# Patient Record
Sex: FEMALE | Race: WHITE | NOT HISPANIC OR LATINO | Employment: UNEMPLOYED | URBAN - METROPOLITAN AREA
[De-identification: names, ages, dates, MRNs, and addresses within clinical notes are randomized per-mention and may not be internally consistent; named-entity substitution may affect disease eponyms.]

---

## 2020-09-22 ENCOUNTER — TRANSCRIBE ORDERS (OUTPATIENT)
Dept: PREADMISSION TESTING | Facility: HOSPITAL | Age: 50
End: 2020-09-22

## 2020-09-22 DIAGNOSIS — Z01.818 OTHER SPECIFIED PRE-OPERATIVE EXAMINATION: Primary | ICD-10-CM

## 2020-09-24 ENCOUNTER — APPOINTMENT (OUTPATIENT)
Dept: PREADMISSION TESTING | Facility: HOSPITAL | Age: 50
End: 2020-09-24

## 2020-09-24 VITALS
SYSTOLIC BLOOD PRESSURE: 139 MMHG | WEIGHT: 214 LBS | DIASTOLIC BLOOD PRESSURE: 89 MMHG | BODY MASS INDEX: 37.92 KG/M2 | HEIGHT: 63 IN | TEMPERATURE: 98.7 F | OXYGEN SATURATION: 97 % | HEART RATE: 73 BPM | RESPIRATION RATE: 16 BRPM

## 2020-09-24 LAB
ANION GAP SERPL CALCULATED.3IONS-SCNC: 11.3 MMOL/L (ref 5–15)
BUN SERPL-MCNC: 16 MG/DL (ref 6–20)
BUN/CREAT SERPL: 18.4 (ref 7–25)
CALCIUM SPEC-SCNC: 8.9 MG/DL (ref 8.6–10.5)
CHLORIDE SERPL-SCNC: 105 MMOL/L (ref 98–107)
CO2 SERPL-SCNC: 25.7 MMOL/L (ref 22–29)
CREAT SERPL-MCNC: 0.87 MG/DL (ref 0.57–1)
DEPRECATED RDW RBC AUTO: 43.1 FL (ref 37–54)
ERYTHROCYTE [DISTWIDTH] IN BLOOD BY AUTOMATED COUNT: 13.1 % (ref 12.3–15.4)
GFR SERPL CREATININE-BSD FRML MDRD: 69 ML/MIN/1.73
GLUCOSE SERPL-MCNC: 91 MG/DL (ref 65–99)
HCG SERPL QL: NEGATIVE
HCT VFR BLD AUTO: 40.9 % (ref 34–46.6)
HGB BLD-MCNC: 14.2 G/DL (ref 12–15.9)
MCH RBC QN AUTO: 31.1 PG (ref 26.6–33)
MCHC RBC AUTO-ENTMCNC: 34.7 G/DL (ref 31.5–35.7)
MCV RBC AUTO: 89.5 FL (ref 79–97)
PLATELET # BLD AUTO: 193 10*3/MM3 (ref 140–450)
PMV BLD AUTO: 12.3 FL (ref 6–12)
POTASSIUM SERPL-SCNC: 3.8 MMOL/L (ref 3.5–5.2)
RBC # BLD AUTO: 4.57 10*6/MM3 (ref 3.77–5.28)
SODIUM SERPL-SCNC: 142 MMOL/L (ref 136–145)
WBC # BLD AUTO: 9.03 10*3/MM3 (ref 3.4–10.8)

## 2020-09-24 PROCEDURE — 93010 ELECTROCARDIOGRAM REPORT: CPT | Performed by: INTERNAL MEDICINE

## 2020-09-24 PROCEDURE — 93005 ELECTROCARDIOGRAM TRACING: CPT

## 2020-09-24 PROCEDURE — 85027 COMPLETE CBC AUTOMATED: CPT | Performed by: UROLOGY

## 2020-09-24 PROCEDURE — 80048 BASIC METABOLIC PNL TOTAL CA: CPT | Performed by: UROLOGY

## 2020-09-24 PROCEDURE — 36415 COLL VENOUS BLD VENIPUNCTURE: CPT

## 2020-09-24 PROCEDURE — 84703 CHORIONIC GONADOTROPIN ASSAY: CPT | Performed by: UROLOGY

## 2020-09-24 RX ORDER — FLUOXETINE HYDROCHLORIDE 20 MG/1
60 CAPSULE ORAL EVERY MORNING
COMMUNITY

## 2020-09-24 RX ORDER — LORAZEPAM 0.5 MG/1
0.5 TABLET ORAL
COMMUNITY
Start: 2020-08-30 | End: 2020-09-24

## 2020-09-24 RX ORDER — LOSARTAN POTASSIUM 100 MG/1
100 TABLET ORAL EVERY MORNING
COMMUNITY

## 2020-09-24 RX ORDER — CLOPIDOGREL BISULFATE 75 MG/1
75 TABLET ORAL DAILY
COMMUNITY

## 2020-09-24 RX ORDER — LORAZEPAM 1 MG/1
1 TABLET ORAL EVERY 8 HOURS PRN
COMMUNITY

## 2020-09-26 ENCOUNTER — LAB (OUTPATIENT)
Dept: LAB | Facility: HOSPITAL | Age: 50
End: 2020-09-26

## 2020-09-26 ENCOUNTER — APPOINTMENT (OUTPATIENT)
Dept: LAB | Facility: HOSPITAL | Age: 50
End: 2020-09-26

## 2020-09-26 DIAGNOSIS — Z01.818 OTHER SPECIFIED PRE-OPERATIVE EXAMINATION: ICD-10-CM

## 2020-09-26 PROCEDURE — U0004 COV-19 TEST NON-CDC HGH THRU: HCPCS

## 2020-09-26 PROCEDURE — C9803 HOPD COVID-19 SPEC COLLECT: HCPCS

## 2020-09-28 LAB — SARS-COV-2 RNA RESP QL NAA+PROBE: NOT DETECTED

## 2020-09-29 ENCOUNTER — HOSPITAL ENCOUNTER (OUTPATIENT)
Facility: HOSPITAL | Age: 50
Setting detail: HOSPITAL OUTPATIENT SURGERY
Discharge: HOME | End: 2020-09-29
Attending: UROLOGY | Admitting: UROLOGY

## 2020-09-29 ENCOUNTER — ANESTHESIA EVENT (OUTPATIENT)
Dept: PERIOP | Facility: HOSPITAL | Age: 50
End: 2020-09-29

## 2020-09-29 ENCOUNTER — ANESTHESIA (OUTPATIENT)
Dept: PERIOP | Facility: HOSPITAL | Age: 50
End: 2020-09-29

## 2020-09-29 VITALS
TEMPERATURE: 97.8 F | BODY MASS INDEX: 37.53 KG/M2 | WEIGHT: 211.8 LBS | HEIGHT: 63 IN | RESPIRATION RATE: 18 BRPM | SYSTOLIC BLOOD PRESSURE: 120 MMHG | HEART RATE: 86 BPM | OXYGEN SATURATION: 95 % | DIASTOLIC BLOOD PRESSURE: 76 MMHG

## 2020-09-29 DIAGNOSIS — N39.3 STRESS INCONTINENCE IN FEMALE: Primary | ICD-10-CM

## 2020-09-29 PROCEDURE — 25010000002 CEFAZOLIN 1-4 GM/50ML-% SOLUTION: Performed by: UROLOGY

## 2020-09-29 PROCEDURE — 25010000002 DEXAMETHASONE PER 1 MG: Performed by: ANESTHESIOLOGY

## 2020-09-29 PROCEDURE — C1771 REP DEV, URINARY, W/SLING: HCPCS | Performed by: UROLOGY

## 2020-09-29 PROCEDURE — 25010000002 MIDAZOLAM PER 1 MG: Performed by: ANESTHESIOLOGY

## 2020-09-29 PROCEDURE — 25010000002 ONDANSETRON PER 1 MG: Performed by: ANESTHESIOLOGY

## 2020-09-29 PROCEDURE — 25010000002 KETOROLAC TROMETHAMINE PER 15 MG: Performed by: ANESTHESIOLOGY

## 2020-09-29 PROCEDURE — 25010000002 FENTANYL CITRATE (PF) 100 MCG/2ML SOLUTION: Performed by: ANESTHESIOLOGY

## 2020-09-29 PROCEDURE — 25010000002 PROPOFOL 10 MG/ML EMULSION: Performed by: ANESTHESIOLOGY

## 2020-09-29 DEVICE — SLNG TVT EXACT CONTINENCE SYS BX/1EA: Type: IMPLANTABLE DEVICE | Site: VAGINA | Status: FUNCTIONAL

## 2020-09-29 DEVICE — FLOSEAL HEMOSTATIC MATRIX, 10ML
Type: IMPLANTABLE DEVICE | Site: VAGINA | Status: FUNCTIONAL
Brand: FLOSEAL HEMOSTATIC MATRIX

## 2020-09-29 RX ORDER — MIDAZOLAM HYDROCHLORIDE 1 MG/ML
1 INJECTION INTRAMUSCULAR; INTRAVENOUS
Status: DISCONTINUED | OUTPATIENT
Start: 2020-09-29 | End: 2020-09-29 | Stop reason: HOSPADM

## 2020-09-29 RX ORDER — LIDOCAINE HYDROCHLORIDE AND EPINEPHRINE 10; 10 MG/ML; UG/ML
INJECTION, SOLUTION INFILTRATION; PERINEURAL AS NEEDED
Status: DISCONTINUED | OUTPATIENT
Start: 2020-09-29 | End: 2020-09-29 | Stop reason: HOSPADM

## 2020-09-29 RX ORDER — PROMETHAZINE HYDROCHLORIDE 25 MG/1
25 SUPPOSITORY RECTAL ONCE AS NEEDED
Status: DISCONTINUED | OUTPATIENT
Start: 2020-09-29 | End: 2020-09-29 | Stop reason: HOSPADM

## 2020-09-29 RX ORDER — CEFAZOLIN SODIUM 1 G/50ML
1 INJECTION, SOLUTION INTRAVENOUS ONCE
Status: COMPLETED | OUTPATIENT
Start: 2020-09-29 | End: 2020-09-29

## 2020-09-29 RX ORDER — HYDROMORPHONE HYDROCHLORIDE 1 MG/ML
0.5 INJECTION, SOLUTION INTRAMUSCULAR; INTRAVENOUS; SUBCUTANEOUS
Status: DISCONTINUED | OUTPATIENT
Start: 2020-09-29 | End: 2020-09-29 | Stop reason: HOSPADM

## 2020-09-29 RX ORDER — FENTANYL CITRATE 50 UG/ML
50 INJECTION, SOLUTION INTRAMUSCULAR; INTRAVENOUS
Status: DISCONTINUED | OUTPATIENT
Start: 2020-09-29 | End: 2020-09-29 | Stop reason: HOSPADM

## 2020-09-29 RX ORDER — HYDROCODONE BITARTRATE AND ACETAMINOPHEN 7.5; 325 MG/1; MG/1
1 TABLET ORAL EVERY 6 HOURS PRN
Qty: 20 TABLET | Refills: 0 | Status: SHIPPED | OUTPATIENT
Start: 2020-09-29

## 2020-09-29 RX ORDER — SODIUM CHLORIDE 0.9 % (FLUSH) 0.9 %
10 SYRINGE (ML) INJECTION AS NEEDED
Status: DISCONTINUED | OUTPATIENT
Start: 2020-09-29 | End: 2020-09-29 | Stop reason: HOSPADM

## 2020-09-29 RX ORDER — MAGNESIUM HYDROXIDE 1200 MG/15ML
LIQUID ORAL AS NEEDED
Status: DISCONTINUED | OUTPATIENT
Start: 2020-09-29 | End: 2020-09-29 | Stop reason: HOSPADM

## 2020-09-29 RX ORDER — LIDOCAINE HYDROCHLORIDE 20 MG/ML
INJECTION, SOLUTION INFILTRATION; PERINEURAL AS NEEDED
Status: DISCONTINUED | OUTPATIENT
Start: 2020-09-29 | End: 2020-09-29 | Stop reason: SURG

## 2020-09-29 RX ORDER — DIPHENHYDRAMINE HCL 25 MG
25 CAPSULE ORAL
Status: DISCONTINUED | OUTPATIENT
Start: 2020-09-29 | End: 2020-09-29 | Stop reason: HOSPADM

## 2020-09-29 RX ORDER — DEXAMETHASONE SODIUM PHOSPHATE 10 MG/ML
INJECTION INTRAMUSCULAR; INTRAVENOUS AS NEEDED
Status: DISCONTINUED | OUTPATIENT
Start: 2020-09-29 | End: 2020-09-29 | Stop reason: SURG

## 2020-09-29 RX ORDER — EPHEDRINE SULFATE 50 MG/ML
INJECTION, SOLUTION INTRAVENOUS AS NEEDED
Status: DISCONTINUED | OUTPATIENT
Start: 2020-09-29 | End: 2020-09-29 | Stop reason: SURG

## 2020-09-29 RX ORDER — EPHEDRINE SULFATE 50 MG/ML
5 INJECTION, SOLUTION INTRAVENOUS ONCE AS NEEDED
Status: DISCONTINUED | OUTPATIENT
Start: 2020-09-29 | End: 2020-09-29 | Stop reason: HOSPADM

## 2020-09-29 RX ORDER — SULFAMETHOXAZOLE AND TRIMETHOPRIM 800; 160 MG/1; MG/1
1 TABLET ORAL 2 TIMES DAILY
Qty: 6 TABLET | Refills: 0 | Status: SHIPPED | OUTPATIENT
Start: 2020-09-29

## 2020-09-29 RX ORDER — NALOXONE HCL 0.4 MG/ML
0.2 VIAL (ML) INJECTION AS NEEDED
Status: DISCONTINUED | OUTPATIENT
Start: 2020-09-29 | End: 2020-09-29 | Stop reason: HOSPADM

## 2020-09-29 RX ORDER — ONDANSETRON 2 MG/ML
4 INJECTION INTRAMUSCULAR; INTRAVENOUS ONCE
Status: COMPLETED | OUTPATIENT
Start: 2020-09-29 | End: 2020-09-29

## 2020-09-29 RX ORDER — HYDROCODONE BITARTRATE AND ACETAMINOPHEN 7.5; 325 MG/1; MG/1
1 TABLET ORAL ONCE AS NEEDED
Status: COMPLETED | OUTPATIENT
Start: 2020-09-29 | End: 2020-09-29

## 2020-09-29 RX ORDER — FLUMAZENIL 0.1 MG/ML
0.2 INJECTION INTRAVENOUS AS NEEDED
Status: DISCONTINUED | OUTPATIENT
Start: 2020-09-29 | End: 2020-09-29 | Stop reason: HOSPADM

## 2020-09-29 RX ORDER — DIPHENHYDRAMINE HYDROCHLORIDE 50 MG/ML
12.5 INJECTION INTRAMUSCULAR; INTRAVENOUS
Status: DISCONTINUED | OUTPATIENT
Start: 2020-09-29 | End: 2020-09-29 | Stop reason: HOSPADM

## 2020-09-29 RX ORDER — KETOROLAC TROMETHAMINE 30 MG/ML
INJECTION, SOLUTION INTRAMUSCULAR; INTRAVENOUS AS NEEDED
Status: DISCONTINUED | OUTPATIENT
Start: 2020-09-29 | End: 2020-09-29 | Stop reason: SURG

## 2020-09-29 RX ORDER — SODIUM CHLORIDE, SODIUM LACTATE, POTASSIUM CHLORIDE, CALCIUM CHLORIDE 600; 310; 30; 20 MG/100ML; MG/100ML; MG/100ML; MG/100ML
9 INJECTION, SOLUTION INTRAVENOUS CONTINUOUS
Status: DISCONTINUED | OUTPATIENT
Start: 2020-09-29 | End: 2020-09-29 | Stop reason: HOSPADM

## 2020-09-29 RX ORDER — HYDRALAZINE HYDROCHLORIDE 20 MG/ML
5 INJECTION INTRAMUSCULAR; INTRAVENOUS
Status: DISCONTINUED | OUTPATIENT
Start: 2020-09-29 | End: 2020-09-29 | Stop reason: HOSPADM

## 2020-09-29 RX ORDER — FAMOTIDINE 10 MG/ML
20 INJECTION, SOLUTION INTRAVENOUS ONCE
Status: COMPLETED | OUTPATIENT
Start: 2020-09-29 | End: 2020-09-29

## 2020-09-29 RX ORDER — LABETALOL HYDROCHLORIDE 5 MG/ML
5 INJECTION, SOLUTION INTRAVENOUS
Status: DISCONTINUED | OUTPATIENT
Start: 2020-09-29 | End: 2020-09-29 | Stop reason: HOSPADM

## 2020-09-29 RX ORDER — PROMETHAZINE HYDROCHLORIDE 25 MG/1
25 TABLET ORAL ONCE AS NEEDED
Status: DISCONTINUED | OUTPATIENT
Start: 2020-09-29 | End: 2020-09-29 | Stop reason: HOSPADM

## 2020-09-29 RX ORDER — OXYCODONE AND ACETAMINOPHEN 7.5; 325 MG/1; MG/1
1 TABLET ORAL ONCE AS NEEDED
Status: DISCONTINUED | OUTPATIENT
Start: 2020-09-29 | End: 2020-09-29 | Stop reason: HOSPADM

## 2020-09-29 RX ORDER — FENTANYL CITRATE 50 UG/ML
INJECTION, SOLUTION INTRAMUSCULAR; INTRAVENOUS AS NEEDED
Status: DISCONTINUED | OUTPATIENT
Start: 2020-09-29 | End: 2020-09-29 | Stop reason: SURG

## 2020-09-29 RX ORDER — PROPOFOL 10 MG/ML
VIAL (ML) INTRAVENOUS AS NEEDED
Status: DISCONTINUED | OUTPATIENT
Start: 2020-09-29 | End: 2020-09-29 | Stop reason: SURG

## 2020-09-29 RX ORDER — SODIUM CHLORIDE 0.9 % (FLUSH) 0.9 %
10 SYRINGE (ML) INJECTION EVERY 12 HOURS SCHEDULED
Status: DISCONTINUED | OUTPATIENT
Start: 2020-09-29 | End: 2020-09-29 | Stop reason: HOSPADM

## 2020-09-29 RX ORDER — ONDANSETRON 2 MG/ML
4 INJECTION INTRAMUSCULAR; INTRAVENOUS ONCE AS NEEDED
Status: DISCONTINUED | OUTPATIENT
Start: 2020-09-29 | End: 2020-09-29 | Stop reason: HOSPADM

## 2020-09-29 RX ADMIN — KETOROLAC TROMETHAMINE 30 MG: 30 INJECTION, SOLUTION INTRAMUSCULAR; INTRAVENOUS at 17:07

## 2020-09-29 RX ADMIN — FENTANYL CITRATE 50 MCG: 50 INJECTION INTRAMUSCULAR; INTRAVENOUS at 17:04

## 2020-09-29 RX ADMIN — MIDAZOLAM 1 MG: 1 INJECTION INTRAMUSCULAR; INTRAVENOUS at 14:54

## 2020-09-29 RX ADMIN — SODIUM CHLORIDE, POTASSIUM CHLORIDE, SODIUM LACTATE AND CALCIUM CHLORIDE 9 ML/HR: 600; 310; 30; 20 INJECTION, SOLUTION INTRAVENOUS at 13:53

## 2020-09-29 RX ADMIN — ONDANSETRON 4 MG: 2 INJECTION INTRAMUSCULAR; INTRAVENOUS at 15:45

## 2020-09-29 RX ADMIN — FENTANYL CITRATE 50 MCG: 50 INJECTION INTRAMUSCULAR; INTRAVENOUS at 16:21

## 2020-09-29 RX ADMIN — DEXAMETHASONE SODIUM PHOSPHATE 6 MG: 10 INJECTION INTRAMUSCULAR; INTRAVENOUS at 16:22

## 2020-09-29 RX ADMIN — SODIUM CHLORIDE, POTASSIUM CHLORIDE, SODIUM LACTATE AND CALCIUM CHLORIDE: 600; 310; 30; 20 INJECTION, SOLUTION INTRAVENOUS at 16:09

## 2020-09-29 RX ADMIN — PROPOFOL 250 MG: 10 INJECTION, EMULSION INTRAVENOUS at 16:11

## 2020-09-29 RX ADMIN — CEFAZOLIN SODIUM 1 G: 1 INJECTION, SOLUTION INTRAVENOUS at 16:19

## 2020-09-29 RX ADMIN — EPHEDRINE SULFATE 20 MG: 50 INJECTION INTRAVENOUS at 16:42

## 2020-09-29 RX ADMIN — EPHEDRINE SULFATE 10 MG: 50 INJECTION INTRAVENOUS at 16:34

## 2020-09-29 RX ADMIN — PROPOFOL 100 MG: 10 INJECTION, EMULSION INTRAVENOUS at 17:04

## 2020-09-29 RX ADMIN — FENTANYL CITRATE 50 MCG: 50 INJECTION, SOLUTION INTRAMUSCULAR; INTRAVENOUS at 14:53

## 2020-09-29 RX ADMIN — FAMOTIDINE 20 MG: 10 INJECTION, SOLUTION INTRAVENOUS at 14:54

## 2020-09-29 RX ADMIN — HYDROCODONE BITARTRATE AND ACETAMINOPHEN 1 TABLET: 7.5; 325 TABLET ORAL at 18:28

## 2020-09-29 RX ADMIN — LIDOCAINE HYDROCHLORIDE 60 MG: 20 INJECTION, SOLUTION INFILTRATION; PERINEURAL at 16:11

## 2020-09-29 NOTE — ANESTHESIA PROCEDURE NOTES
Airway  Date/Time: 9/29/2020 4:13 PM  Airway not difficult    General Information and Staff    Patient location during procedure: OR  Anesthesiologist: Jeronimo Summers MD    Indications and Patient Condition    Preoxygenated: yes  Mask difficulty assessment: 0 - not attempted    Final Airway Details  Final airway type: supraglottic airway      Successful airway: unique  Size 4    Number of attempts at approach: 1  Assessment: lips, teeth, and gum same as pre-op

## 2020-09-29 NOTE — ANESTHESIA PREPROCEDURE EVALUATION
Anesthesia Evaluation     Patient summary reviewed and Nursing notes reviewed   no history of anesthetic complications:  NPO Solid Status: > 6 hours  NPO Liquid Status: > 6 hours           Airway   Mallampati: II  TM distance: >3 FB  Neck ROM: full  no difficulty expected and No difficulty expected  Dental - normal exam     Pulmonary - normal exam    breath sounds clear to auscultation  (+) a smoker, sleep apnea,   (-) rhonchi, decreased breath sounds, wheezes, rales, stridor  Cardiovascular - normal exam    NYHA Classification: I  ECG reviewed  Rhythm: regular  Rate: normal    (+) hypertension well controlled,   (-) murmur, weak pulses, friction rub, systolic click, carotid bruits, JVD, peripheral edema      Neuro/Psych  (+) TIA, CVA, psychiatric history Depression,     GI/Hepatic/Renal/Endo - negative ROS     Musculoskeletal     Abdominal  - normal exam    Abdomen: soft.   Substance History - negative use     OB/GYN negative ob/gyn ROS         Other   arthritis,                      Anesthesia Plan    ASA 3     general     intravenous induction     Anesthetic plan, all risks, benefits, and alternatives have been provided, discussed and informed consent has been obtained with: patient.

## 2020-09-29 NOTE — ANESTHESIA POSTPROCEDURE EVALUATION
"Patient: Lisa Gonsales    Procedure Summary     Date: 09/29/20 Room / Location: [facility] OR [address] [place] [address] MAIN OR    Anesthesia Start: 1604 Anesthesia Stop: 1729    Procedure: SLING OPERATION, FEMALE (N/A Vagina) Diagnosis:     Surgeon: Rodriguez Castellanos Jr., MD Provider: Jeronimo Summers MD    Anesthesia Type: general ASA Status: 3          Anesthesia Type: general    Vitals  Vitals Value Taken Time   /66 09/29/20 1820   Temp 36.6 °C (97.8 °F) 09/29/20 1820   Pulse 86 09/29/20 1832   Resp 18 09/29/20 1820   SpO2 90 % 09/29/20 1832   Vitals shown include unvalidated device data.        Post Anesthesia Care and Evaluation    Pain management: adequate  Airway patency: patent  Anesthetic complications: No anesthetic complications    Cardiovascular status: acceptable  Respiratory status: acceptable  Hydration status: acceptable    Comments: /76   Pulse 86   Temp 36.6 °C (97.8 °F)   Resp 18   Ht 160 cm (63\")   Wt 96.1 kg (211 lb 12.8 oz)   SpO2 95%   Breastfeeding No   BMI 37.52 kg/m²         "

## 2020-12-22 NOTE — DISCHARGE INSTRUCTIONS
Take the following medications the morning of surgery with a small sip of water: PROZAC ATIVAN    ARRIVE 2:00 PM  9/29/20      If you are on prescription narcotic pain medication to control your pain you may also take that medication the morning of surgery.    General Instructions:  • Do not eat or drink anything after midnight the night before surgery.  • Infants may have breast milk up to four hours before surgery.  • Infants drinking formula may drink formula up to six hours before surgery.   • Patients who avoid smoking, chewing tobacco and alcohol for 4 weeks prior to surgery have a reduced risk of post-operative complications.  Quit smoking as many days before surgery as you can.  • Do not smoke, use chewing tobacco or drink alcohol the day of surgery.   • If applicable bring your C-PAP/ BI-PAP machine.  • Bring any papers given to you in the doctor’s office.  • Wear clean comfortable clothes.  • Do not wear contact lenses, false eyelashes or make-up.  Bring a case for your glasses.   • Bring crutches or walker if applicable.  • Remove all piercings.  Leave jewelry and any other valuables at home.  • Hair extensions with metal clips must be removed prior to surgery.  • The Pre-Admission Testing nurse will instruct you to bring medications if unable to obtain an accurate list in Pre-Admission Testing.        If you were given a blood bank ID arm band remember to bring it with you the day of surgery.    Preventing a Surgical Site Infection:  • For 2 to 3 days before surgery, avoid shaving with a razor because the razor can irritate skin and make it easier to develop an infection.    • Any areas of open skin can increase the risk of a post-operative wound infection by allowing bacteria to enter and travel throughout the body.  Notify your surgeon if you have any skin wounds / rashes even if it is not near the expected surgical site.  The area will need assessed to determine if surgery should be delayed until it  Surgery is healed.  • The night prior to surgery shower using a fresh bar of anti-bacterial soap (such as Dial) and clean washcloth.  Sleep in a clean bed with clean clothing.  Do not allow pets to sleep with you.  • Shower on the morning of surgery using a fresh bar of anti-bacterial soap (such as Dial) and clean washcloth.  Dry with a clean towel and dress in clean clothing.  • Ask your surgeon if you will be receiving antibiotics prior to surgery.  • Make sure you, your family, and all healthcare providers clean their hands with soap and water or an alcohol based hand  before caring for you or your wound.    Day of surgery:  Your arrival time is approximately two hours before your scheduled surgery time.  Upon arrival, a Pre-op nurse and Anesthesiologist will review your health history, obtain vital signs, and answer questions you may have.  The only belongings needed at this time will be your home medications and if applicable your C-PAP/BI-PAP machine.  If you are staying overnight your family can leave the rest of your belongings in the car and bring them to your room later.  A Pre-op nurse will start an IV and you may receive medication in preparation for surgery, including something to help you relax.  Your family will be able to see you in the Pre-op area.  Two visitors at a time will be allowed in the Pre-op room.  While you are in surgery your family should notify the waiting room  if they leave the waiting room area and provide a contact phone number.    Please be aware that surgery does come with discomfort.  We want to make every effort to control your discomfort so please discuss any uncontrolled symptoms with your nurse.   Your doctor will most likely have prescribed pain medications.      If you are going home after surgery you will receive individualized written care instructions before being discharged.  A responsible adult must drive you to and from the hospital on the day of your  Surgery Surgery Surgery surgery and stay with you for 24 hours.    If you are staying overnight following surgery, you will be transported to your hospital room following the recovery period.  [facility] has all private rooms.    If you have any questions please call Pre-Admission Testing at [phone].  Deductibles and co-payments are collected on the day of service. Please be prepared to pay the required co-pay, deductible or deposit on the day of service as defined by your plan.    Patient Education for Self-Quarantine Process    Following your COVID testing, we strongly recommend that you do not leave your home after you have been tested for COVID except to get medical care. This includes not going to work, school or to public areas.  If this is not possible for you to do please limit your activities to only required outings.  Be sure to wear a mask when you are with other people, practice social distancing and wash your hands frequently.      The following items provide additional details to keep you safe.  • Wash your hands with soap and water frequently for at least 20 seconds.   • Avoid touching your eyes, nose and mouth with unwashed hands.  • Do not share anything - utensils, towels, food from the same bowl.   • Have your own utensils, drinking glass, dishes, towels and bedding.   • Do not have visitors.   • Do use FaceTime to stay in touch with family and friends.  • You should stay in a specific room away from others if possible.   • Stay at least 6 feet away from others in the home if you cannot have a dedicated room to yourself.   • Do not snuggle with your pet. While the CDC says there is no evidence that pets can spread COVID-19 or be infected from humans, it is probably best to avoid “petting, snuggling, being kissed or licked and sharing food (during self-quarantine)”, according to the CDC.   • Sanitize household surfaces daily. Include all high touch areas (door handles, light switches, phones,  countertops, etc.)  • Do not share a bathroom with others, if possible.   • Wear a mask around others in your home if you are unable to stay in a separate room or 6 feet apart. If  you are unable to wear a mask, have your family member wear a mask if they must be within 6 feet of you.   Call your surgeon immediately if you experience any of the following symptoms:  • Sore Throat  • Shortness of Breath or difficulty breathing  • Cough  • Chills  • Body soreness or muscle pain  • Headache  • Fever  • New loss of taste or smell  • Do not arrive for your surgery ill.  Your procedure will need to be rescheduled to another time.  You will need to call your physician before the day of surgery to avoid any unnecessary exposure to hospital staff as well as other patients.

## 2024-08-06 ENCOUNTER — OFFICE VISIT (OUTPATIENT)
Dept: CARDIOLOGY | Facility: CLINIC | Age: 54
End: 2024-08-06
Payer: COMMERCIAL

## 2024-08-06 VITALS
HEIGHT: 63 IN | HEART RATE: 62 BPM | SYSTOLIC BLOOD PRESSURE: 135 MMHG | DIASTOLIC BLOOD PRESSURE: 78 MMHG | WEIGHT: 257 LBS | BODY MASS INDEX: 45.54 KG/M2

## 2024-08-06 DIAGNOSIS — R07.89 CHEST PAIN, ATYPICAL: Primary | ICD-10-CM

## 2024-08-06 DIAGNOSIS — I10 HYPERTENSION, UNSPECIFIED TYPE: ICD-10-CM

## 2024-08-06 PROCEDURE — 99203 OFFICE O/P NEW LOW 30 MIN: CPT | Performed by: INTERNAL MEDICINE

## 2024-08-06 PROCEDURE — 93000 ELECTROCARDIOGRAM COMPLETE: CPT | Performed by: INTERNAL MEDICINE

## 2024-08-06 RX ORDER — FAMOTIDINE 20 MG/1
1 TABLET, FILM COATED ORAL EVERY 12 HOURS SCHEDULED
COMMUNITY
Start: 2024-07-12

## 2024-08-06 RX ORDER — BUSPIRONE HYDROCHLORIDE 15 MG/1
15 TABLET ORAL 3 TIMES DAILY
COMMUNITY

## 2024-08-06 RX ORDER — LISINOPRIL 10 MG/1
10 TABLET ORAL DAILY
COMMUNITY

## 2024-08-06 RX ORDER — METOPROLOL SUCCINATE 25 MG/1
25 TABLET, EXTENDED RELEASE ORAL DAILY
COMMUNITY

## 2024-08-06 NOTE — PROGRESS NOTES
NEW PATIENT CHEST PAIN   Subjective:        [facility]  Cardiology Consult Note    Patient Identification:  Name: Tejas Gonsales  Age: 53 y.o.  Sex: female  :  1970  MRN: [id number]             CC  Cp went to er  Htn  Borderline dm  F/h      History of Present Illness:   53-year-old female went to the emergency room complaining of the chest pains retrosternal has significant cardiac risk factors with hypertension borderline diabetes and family history here for the cardiac evaluation    Comorbid cardiac risk factors:     Past Medical History:  Past Medical History:   Diagnosis Date    Anxiety     DDD (degenerative disc disease), lumbar     Depression     History of pancreatitis     History of recent fall     Hypertension     Osteoarthritis     Phlebitis     BILATERAL LOWER EXTREMITY WITH PREGNANCY    Sleep apnea     CPAP    Stress incontinence in female     Stroke     TIA (transient ischemic attack)      Past Surgical History:  Past Surgical History:   Procedure Laterality Date    APPENDECTOMY      BILE DUCT STENT PLACEMENT      COLONOSCOPY      ENDOMETRIAL ABLATION W/ NOVASURE      LAPAROSCOPIC CHOLECYSTECTOMY      LAPAROTOMY OOPHERECTOMY Right     SALPINGECTOMY Right     TRANSVAGINAL TAPING SUSPENSION N/A 2020    Procedure: SLING OPERATION, FEMALE;  Surgeon: Rodriguez Castellanos Jr., MD;  Location: [facility];  Service: Urology;  Laterality: N/A;      Allergies:  Allergies   Allergen Reactions    Naproxen Hives     Home Meds:  (Not in a hospital admission)    Current Meds:     Current Outpatient Medications:     busPIRone (BUSPAR) 15 MG tablet, Take 1 tablet by mouth 3 (Three) Times a Day., Disp: , Rfl:     clopidogrel (PLAVIX) 75 MG tablet, Take 1 tablet by mouth Daily. PT HOLDING FOR SURGERY/PT STOPPED TAKING ON OWN, INSTRUCTED TO CONTACT PCP REGARDING HOLDING PRIOR TO SURGERY, Disp: , Rfl:     famotidine (PEPCID) 20 MG tablet, Take 1 tablet by mouth Every 12 (Twelve) Hours.,  Disp: , Rfl:     FLUoxetine (PROzac) 20 MG capsule, Take 3 capsules by mouth Every Morning., Disp: , Rfl:     lisinopril (PRINIVIL,ZESTRIL) 10 MG tablet, Take 1 tablet by mouth Daily., Disp: , Rfl:     LORazepam (ATIVAN) 1 MG tablet, Take 1 tablet by mouth Every 8 (Eight) Hours As Needed for Anxiety., Disp: , Rfl:     losartan (COZAAR) 100 MG tablet, Take 1 tablet by mouth Every Morning., Disp: , Rfl:     metoprolol succinate XL (TOPROL-XL) 25 MG 24 hr tablet, Take 1 tablet by mouth Daily., Disp: , Rfl:   Social History:   Social History     Tobacco Use    Smoking status: Former     Current packs/day: 1.00     Average packs/day: 1 pack/day for 2.6 years (2.6 ttl pk-yrs)     Types: Cigarettes     Start date: 1978    Smokeless tobacco: Never   Substance Use Topics    Alcohol use: Yes     Comment: twice per year      Family History:  Family History   Problem Relation Age of Onset    Malig Hyperthermia Neg Hx         Review of Systems    Constitutional: No weakness,fatigue, fever, rigors, chills   Eyes: No vision changes, eye pain   ENT/oropharynx: No difficulty swallowing, sore throat, epistaxis, changes in hearing   Cardiovascular: Chest pain   Respiratory: No shortness of breath, dyspnea on exertion, cough, wheezing hemoptysis   Gastrointestinal: No abdominal pain, nausea, vomiting, diarrhea, bloody stools   Genitourinary: No hematuria, dysuria   Neurological: No headache, tremors, numbness,  one-sided weakness    Musculoskeletal: No cramps, myalgias,  joint pain, joint swelling   Integument: No rash, edema           Constitutional:  Heart Rate:  [62] 62  BP: (135)/(78) 135/78    Physical Exam   General:  Appears in no acute distress  Eyes: PERTL,  HEENT:  No JVD. Thyroid not visibly enlarged. No mucosal pallor or cyanosis  Respiratory: Respirations regular and unlabored at rest. BBS with good air entry in all fields. No crackles, rubs or wheezes auscultated  Cardiovascular: S1S2 Regular rate and rhythm. No murmur,  rub or gallop auscultated. No carotid bruits. DP/PT pulses    . No pretibial pitting edema  Gastrointestinal: Abdomen soft, flat, non tender. Bowel sounds present. No hepatosplenomegaly. No ascites  Musculoskeletal: SIERRA x4. No abnormal movements  Extremities: No digital clubbing or cyanosis  Skin: Color pink. Skin warm and dry to touch. No rashes  No xanthoma  Neuro: AAO x3 CN II-XII grossly intact            ECG 12 Lead    Date/Time: 8/6/2024 1:50 PM  Performed by: Christiano Zambrano MD    Authorized by: Christiano Zambrano MD  Comparison: not compared with previous ECG   Previous ECG: no previous ECG available  Rhythm: sinus rhythm    Clinical impression: non-specific ECG              Cardiographics  ECG:     Telemetry:    Echocardiogram:   No results found for this or any previous visit.        Imaging  Chest X-ray:     Lab Review                               Assessment:/ Recommendations / Plan:                  ICD-10-CM ICD-9-CM   1. Chest pain, atypical  R07.89 786.59   2. Hypertension, unspecified type  I10 401.9     1. Chest pain, atypical  Considering the patient's symptoms as well as clinical situation and  EKG findings, along with cardiac risk factors, ischemic workup is necessary to rule out ischemic cardiomyopathy, stress induced arrhythmias, and functional capacity for diagnosis as well as prognostic consideration    - Treadmill Stress Test; Future  - Adult Transthoracic Echo Complete W/ Cont if Necessary Per Protocol; Future    2. Hypertension, unspecified type  Considering patient's medical condition as well as the risk factors, patient will require echocardiogram for further evaluation for the LV function, four-chamber evaluation, including the pressures, valvular function and  pericardial disease and pericardial effusion    - Treadmill Stress Test; Future  - Adult Transthoracic Echo Complete W/ Cont if Necessary Per Protocol; Future    Cp went to er  Htn  Borderline dm  F/h  Ett,  echo  Follow up  Labs/tests ordered for am      Christiano Zambrano MD  8/7/2024, 12:28 EDT      EMR Dragon/Transcription:   Dictated utilizing Dragon dictation

## 2024-08-07 PROBLEM — I10 HYPERTENSION: Status: ACTIVE | Noted: 2024-08-07

## 2024-08-07 PROBLEM — R07.89 CHEST PAIN, ATYPICAL: Status: ACTIVE | Noted: 2024-08-07

## 2024-08-26 ENCOUNTER — HOSPITAL ENCOUNTER (OUTPATIENT)
Dept: CARDIOLOGY | Facility: HOSPITAL | Age: 54
Discharge: HOME | End: 2024-08-26
Payer: COMMERCIAL

## 2024-08-26 ENCOUNTER — OFFICE VISIT (OUTPATIENT)
Dept: CARDIOLOGY | Facility: CLINIC | Age: 54
End: 2024-08-26
Payer: COMMERCIAL

## 2024-08-26 VITALS
HEIGHT: 64 IN | WEIGHT: 257 LBS | SYSTOLIC BLOOD PRESSURE: 144 MMHG | DIASTOLIC BLOOD PRESSURE: 78 MMHG | HEART RATE: 99 BPM | BODY MASS INDEX: 43.87 KG/M2

## 2024-08-26 VITALS
WEIGHT: 257 LBS | DIASTOLIC BLOOD PRESSURE: 78 MMHG | HEIGHT: 64 IN | BODY MASS INDEX: 43.87 KG/M2 | HEART RATE: 99 BPM | SYSTOLIC BLOOD PRESSURE: 144 MMHG | OXYGEN SATURATION: 98 %

## 2024-08-26 DIAGNOSIS — R07.89 CHEST PAIN, ATYPICAL: ICD-10-CM

## 2024-08-26 DIAGNOSIS — R07.89 CHEST PAIN, ATYPICAL: Primary | ICD-10-CM

## 2024-08-26 DIAGNOSIS — R94.39 ABNORMAL CARDIOVASCULAR STRESS TEST: ICD-10-CM

## 2024-08-26 DIAGNOSIS — I10 HYPERTENSION, UNSPECIFIED TYPE: ICD-10-CM

## 2024-08-26 DIAGNOSIS — I10 HYPERTENSION, UNSPECIFIED TYPE: Primary | ICD-10-CM

## 2024-08-26 PROCEDURE — 93017 CV STRESS TEST TRACING ONLY: CPT

## 2024-08-26 PROCEDURE — 99214 OFFICE O/P EST MOD 30 MIN: CPT | Performed by: INTERNAL MEDICINE

## 2024-08-26 RX ORDER — NITROGLYCERIN 0.4 MG/1
0.4 TABLET SUBLINGUAL
Status: DISCONTINUED | OUTPATIENT
Start: 2024-08-26 | End: 2024-08-27 | Stop reason: HOSPADM

## 2024-08-26 RX ADMIN — NITROGLYCERIN 0.4 MG: 0.4 TABLET SUBLINGUAL at 12:26

## 2024-08-26 NOTE — PROGRESS NOTES
RESULTS   Subjective:        Tejas Gonsales is a 53 y.o. female who here for follow up    CC  The follow-up stress test results  HPI  53-year-old female with hypertension abnormal stress test as well as atypical chest pain here for the follow-up , chest pain and shortness of breath on exertion     Problems Addressed this Visit          Cardiac and Vasculature    Hypertension    Abnormal cardiovascular stress test       Symptoms and Signs    Chest pain, atypical - Primary     Diagnoses         Codes Comments    Chest pain, atypical    -  Primary ICD-10-CM: R07.89  ICD-9-CM: 786.59     Abnormal cardiovascular stress test     ICD-10-CM: R94.39  ICD-9-CM: 794.39     Hypertension, unspecified type     ICD-10-CM: I10  ICD-9-CM: 401.9           .    The following portions of the patient's history were reviewed and updated as appropriate: allergies, current medications, past family history, past medical history, past social history, past surgical history and problem list.    Past Medical History:   Diagnosis Date    Anxiety     DDD (degenerative disc disease), lumbar     Depression     History of pancreatitis     History of recent fall     Hypertension     Osteoarthritis     Phlebitis     BILATERAL LOWER EXTREMITY WITH PREGNANCY    Sleep apnea     CPAP    Stress incontinence in female     Stroke     TIA (transient ischemic attack)      reports that she has quit smoking. Her smoking use included cigarettes. She started smoking about 46 years ago. She has a 2.7 pack-year smoking history. She has never used smokeless tobacco. She reports current alcohol use. She reports that she does not currently use drugs.   Family History   Problem Relation Age of Onset    Heart disease Mother     Heart disease Father     Malig Hyperthermia Neg Hx        Review of Systems  Constitutional: No wt loss, fever, fatigue  Gastrointestinal: No nausea, abdominal pain  Behavioral/Psych: No insomnia or anxiety   Cardiovascular chest pain and  "shortness of breath  Objective:       Physical Exam           Physical Exam  /78   Pulse 99   Ht 162.6 cm (64\")   Wt 117 kg (257 lb)   BMI 44.11 kg/m²     General appearance: No acute changes   Eyes: Sclerae conjunctivae normal, pupils reactive   HENT: Atraumatic; oropharynx clear with moist mucous membranes and no mucosal ulcerations;  Neck: Trachea midline; NECK, supple, no thyromegaly or lymphadenopathy   Lungs: Normal size and shape, normal breath sounds, equal distribution of air, no rales and rhonchi   CV: S1-S2 regular, no murmurs, no rub, no gallop   Abdomen: Soft, nontender; no masses , no abnormal abdominal sounds   Extremities: No deformity , normal color , no peripheral edema   Skin: Normal temperature, turgor and texture; no rash, ulcers  Psych: Appropriate affect, alert and oriented to person, place and time           Procedures      Echocardiogram:    No results found for this or any previous visit.          Current Outpatient Medications:     busPIRone (BUSPAR) 15 MG tablet, Take 1 tablet by mouth 3 (Three) Times a Day., Disp: , Rfl:     clopidogrel (PLAVIX) 75 MG tablet, Take 1 tablet by mouth Daily. PT HOLDING FOR SURGERY/PT STOPPED TAKING ON OWN, INSTRUCTED TO CONTACT PCP REGARDING HOLDING PRIOR TO SURGERY, Disp: , Rfl:     famotidine (PEPCID) 20 MG tablet, Take 1 tablet by mouth Every 12 (Twelve) Hours., Disp: , Rfl:     FLUoxetine (PROzac) 20 MG capsule, Take 3 capsules by mouth Every Morning., Disp: , Rfl:     lisinopril (PRINIVIL,ZESTRIL) 10 MG tablet, Take 1 tablet by mouth Daily., Disp: , Rfl:     LORazepam (ATIVAN) 1 MG tablet, Take 1 tablet by mouth Every 8 (Eight) Hours As Needed for Anxiety., Disp: , Rfl:     losartan (COZAAR) 100 MG tablet, Take 1 tablet by mouth Every Morning., Disp: , Rfl:     metoprolol succinate XL (TOPROL-XL) 25 MG 24 hr tablet, Take 1 tablet by mouth Daily., Disp: , Rfl:    Assessment:                Plan:          ICD-10-CM ICD-9-CM   1. Chest pain, " atypical  R07.89 786.59   2. Abnormal cardiovascular stress test  R94.39 794.39   3. Hypertension, unspecified type  I10 401.9     1. Chest pain, atypical  Needs further evaluation    2. Abnormal cardiovascular stress test  Needs further evaluation    3. Hypertension, unspecified type  Patient understands importance of blood pressure check at home which patient does regularly and the blood pressures are well under control to the level of less than 140/90        Stress test positive    Procedure, risks and options of cardiac cath explained to pt INCLUDING BUT NOT LIMITED TO MI, STROKE, DEATH, INFECTION HAEMORRHAGE, . Pt understands well and agrees with no further questions.    COUNSELING:    Tejas Ospina was given to patient for the following topics: diagnostic results, risk factor reductions, impressions, risks and benefits of treatment options and importance of treatment compliance .       SMOKING COUNSELING:        Dictated using Dragon dictation

## 2024-08-28 ENCOUNTER — HOSPITAL ENCOUNTER (OUTPATIENT)
Facility: HOSPITAL | Age: 54
Setting detail: OBSERVATION
LOS: 1 days | Discharge: HOME | End: 2024-08-30
Attending: INTERNAL MEDICINE | Admitting: INTERNAL MEDICINE
Payer: COMMERCIAL

## 2024-08-28 DIAGNOSIS — R07.89 CHEST PAIN, ATYPICAL: ICD-10-CM

## 2024-08-28 DIAGNOSIS — R94.39 ABNORMAL CARDIOVASCULAR STRESS TEST: ICD-10-CM

## 2024-08-28 DIAGNOSIS — I10 HYPERTENSION, UNSPECIFIED TYPE: ICD-10-CM

## 2024-08-28 LAB
ALBUMIN SERPL-MCNC: 4.1 G/DL (ref 3.5–5.2)
ALBUMIN/GLOB SERPL: 2.1 G/DL
ALP SERPL-CCNC: 90 U/L (ref 39–117)
ALT SERPL W P-5'-P-CCNC: 27 U/L (ref 1–33)
ANION GAP SERPL CALCULATED.3IONS-SCNC: 9 MMOL/L (ref 5–15)
APTT PPP: 27.3 SECONDS (ref 22.7–35.4)
APTT PPP: 55.1 SECONDS (ref 22.7–35.4)
AST SERPL-CCNC: 18 U/L (ref 1–32)
BASOPHILS # BLD AUTO: 0.04 10*3/MM3 (ref 0–0.2)
BASOPHILS NFR BLD AUTO: 0.5 % (ref 0–1.5)
BILIRUB SERPL-MCNC: 0.6 MG/DL (ref 0–1.2)
BUN SERPL-MCNC: 16 MG/DL (ref 6–20)
BUN/CREAT SERPL: 18.8 (ref 7–25)
CALCIUM SPEC-SCNC: 9.2 MG/DL (ref 8.6–10.5)
CHLORIDE SERPL-SCNC: 105 MMOL/L (ref 98–107)
CO2 SERPL-SCNC: 25 MMOL/L (ref 22–29)
CREAT SERPL-MCNC: 0.85 MG/DL (ref 0.57–1)
DEPRECATED RDW RBC AUTO: 40.6 FL (ref 37–54)
EGFRCR SERPLBLD CKD-EPI 2021: 81 ML/MIN/1.73
EOSINOPHIL # BLD AUTO: 0.12 10*3/MM3 (ref 0–0.4)
EOSINOPHIL NFR BLD AUTO: 1.4 % (ref 0.3–6.2)
ERYTHROCYTE [DISTWIDTH] IN BLOOD BY AUTOMATED COUNT: 12.5 % (ref 12.3–15.4)
GEN 5 2HR TROPONIN T REFLEX: <6 NG/L
GLOBULIN UR ELPH-MCNC: 2 GM/DL
GLUCOSE SERPL-MCNC: 96 MG/DL (ref 65–99)
HCT VFR BLD AUTO: 42.2 % (ref 34–46.6)
HGB BLD-MCNC: 13.8 G/DL (ref 12–15.9)
IMM GRANULOCYTES # BLD AUTO: 0.02 10*3/MM3 (ref 0–0.05)
IMM GRANULOCYTES NFR BLD AUTO: 0.2 % (ref 0–0.5)
INR PPP: 1.05 (ref 0.9–1.1)
LYMPHOCYTES # BLD AUTO: 4.46 10*3/MM3 (ref 0.7–3.1)
LYMPHOCYTES NFR BLD AUTO: 51 % (ref 19.6–45.3)
MAGNESIUM SERPL-MCNC: 2.1 MG/DL (ref 1.6–2.6)
MCH RBC QN AUTO: 28.9 PG (ref 26.6–33)
MCHC RBC AUTO-ENTMCNC: 32.7 G/DL (ref 31.5–35.7)
MCV RBC AUTO: 88.3 FL (ref 79–97)
MONOCYTES # BLD AUTO: 0.7 10*3/MM3 (ref 0.1–0.9)
MONOCYTES NFR BLD AUTO: 8 % (ref 5–12)
NEUTROPHILS NFR BLD AUTO: 3.4 10*3/MM3 (ref 1.7–7)
NEUTROPHILS NFR BLD AUTO: 38.9 % (ref 42.7–76)
NRBC BLD AUTO-RTO: 0 /100 WBC (ref 0–0.2)
NT-PROBNP SERPL-MCNC: <36 PG/ML (ref 0–900)
NT-PROBNP SERPL-MCNC: <36 PG/ML (ref 0–900)
PLATELET # BLD AUTO: 211 10*3/MM3 (ref 140–450)
PMV BLD AUTO: 11.9 FL (ref 6–12)
POTASSIUM SERPL-SCNC: 3.8 MMOL/L (ref 3.5–5.2)
PROT SERPL-MCNC: 6.1 G/DL (ref 6–8.5)
PROTHROMBIN TIME: 13.9 SECONDS (ref 11.7–14.2)
QT INTERVAL: 418 MS
QTC INTERVAL: 464 MS
RBC # BLD AUTO: 4.78 10*6/MM3 (ref 3.77–5.28)
SODIUM SERPL-SCNC: 139 MMOL/L (ref 136–145)
TROPONIN T DELTA: NORMAL
TROPONIN T SERPL HS-MCNC: 6 NG/L
TSH SERPL DL<=0.05 MIU/L-ACNC: 1.14 UIU/ML (ref 0.27–4.2)
WBC NRBC COR # BLD AUTO: 8.74 10*3/MM3 (ref 3.4–10.8)

## 2024-08-28 PROCEDURE — 25010000002 HEPARIN (PORCINE) PER 1000 UNITS

## 2024-08-28 PROCEDURE — 96366 THER/PROPH/DIAG IV INF ADDON: CPT

## 2024-08-28 PROCEDURE — 83880 ASSAY OF NATRIURETIC PEPTIDE: CPT | Performed by: HOSPITALIST

## 2024-08-28 PROCEDURE — 85730 THROMBOPLASTIN TIME PARTIAL: CPT | Performed by: INTERNAL MEDICINE

## 2024-08-28 PROCEDURE — 99222 1ST HOSP IP/OBS MODERATE 55: CPT | Performed by: INTERNAL MEDICINE

## 2024-08-28 PROCEDURE — 93005 ELECTROCARDIOGRAM TRACING: CPT | Performed by: INTERNAL MEDICINE

## 2024-08-28 PROCEDURE — 83735 ASSAY OF MAGNESIUM: CPT

## 2024-08-28 PROCEDURE — 84443 ASSAY THYROID STIM HORMONE: CPT

## 2024-08-28 PROCEDURE — 80053 COMPREHEN METABOLIC PANEL: CPT

## 2024-08-28 PROCEDURE — 25010000002 HEPARIN (PORCINE) 25000-0.45 UT/250ML-% SOLUTION

## 2024-08-28 PROCEDURE — 85025 COMPLETE CBC W/AUTO DIFF WBC: CPT

## 2024-08-28 PROCEDURE — 85610 PROTHROMBIN TIME: CPT

## 2024-08-28 PROCEDURE — 85730 THROMBOPLASTIN TIME PARTIAL: CPT

## 2024-08-28 PROCEDURE — 93010 ELECTROCARDIOGRAM REPORT: CPT | Performed by: INTERNAL MEDICINE

## 2024-08-28 PROCEDURE — 83880 ASSAY OF NATRIURETIC PEPTIDE: CPT

## 2024-08-28 PROCEDURE — 96376 TX/PRO/DX INJ SAME DRUG ADON: CPT

## 2024-08-28 PROCEDURE — 96365 THER/PROPH/DIAG IV INF INIT: CPT

## 2024-08-28 PROCEDURE — 84484 ASSAY OF TROPONIN QUANT: CPT

## 2024-08-28 RX ORDER — HEPARIN SODIUM 10000 [USP'U]/100ML
8.7 INJECTION, SOLUTION INTRAVENOUS
Status: DISCONTINUED | OUTPATIENT
Start: 2024-08-28 | End: 2024-08-30 | Stop reason: HOSPADM

## 2024-08-28 RX ORDER — HEPARIN SODIUM 5000 [USP'U]/ML
30-34.8 INJECTION, SOLUTION INTRAVENOUS; SUBCUTANEOUS EVERY 6 HOURS PRN
Status: DISCONTINUED | OUTPATIENT
Start: 2024-08-28 | End: 2024-08-30 | Stop reason: HOSPADM

## 2024-08-28 RX ORDER — NITROGLYCERIN 0.4 MG/1
0.4 TABLET SUBLINGUAL
Status: DISCONTINUED | OUTPATIENT
Start: 2024-08-28 | End: 2024-08-30 | Stop reason: HOSPADM

## 2024-08-28 RX ORDER — SODIUM CHLORIDE 0.9 % (FLUSH) 0.9 %
10 SYRINGE (ML) INJECTION EVERY 12 HOURS SCHEDULED
Status: DISCONTINUED | OUTPATIENT
Start: 2024-08-28 | End: 2024-08-30 | Stop reason: HOSPADM

## 2024-08-28 RX ORDER — LOSARTAN POTASSIUM 100 MG/1
100 TABLET ORAL EVERY MORNING
Status: DISCONTINUED | OUTPATIENT
Start: 2024-08-29 | End: 2024-08-30 | Stop reason: HOSPADM

## 2024-08-28 RX ORDER — HEPARIN SODIUM 5000 [USP'U]/ML
34.8 INJECTION, SOLUTION INTRAVENOUS; SUBCUTANEOUS ONCE
Status: COMPLETED | OUTPATIENT
Start: 2024-08-28 | End: 2024-08-28

## 2024-08-28 RX ORDER — FAMOTIDINE 20 MG/1
20 TABLET, FILM COATED ORAL EVERY 12 HOURS SCHEDULED
Status: DISCONTINUED | OUTPATIENT
Start: 2024-08-28 | End: 2024-08-30 | Stop reason: HOSPADM

## 2024-08-28 RX ORDER — SODIUM CHLORIDE 0.9 % (FLUSH) 0.9 %
10 SYRINGE (ML) INJECTION AS NEEDED
Status: DISCONTINUED | OUTPATIENT
Start: 2024-08-28 | End: 2024-08-30 | Stop reason: HOSPADM

## 2024-08-28 RX ORDER — SODIUM CHLORIDE 9 MG/ML
40 INJECTION, SOLUTION INTRAVENOUS AS NEEDED
Status: DISCONTINUED | OUTPATIENT
Start: 2024-08-28 | End: 2024-08-30 | Stop reason: HOSPADM

## 2024-08-28 RX ORDER — BUSPIRONE HYDROCHLORIDE 15 MG/1
15 TABLET ORAL 3 TIMES DAILY
Status: DISCONTINUED | OUTPATIENT
Start: 2024-08-28 | End: 2024-08-30 | Stop reason: HOSPADM

## 2024-08-28 RX ORDER — CLOPIDOGREL BISULFATE 75 MG/1
75 TABLET ORAL DAILY
Status: DISCONTINUED | OUTPATIENT
Start: 2024-08-29 | End: 2024-08-30 | Stop reason: HOSPADM

## 2024-08-28 RX ORDER — METOPROLOL SUCCINATE 25 MG/1
25 TABLET, EXTENDED RELEASE ORAL DAILY
Status: DISCONTINUED | OUTPATIENT
Start: 2024-08-29 | End: 2024-08-30 | Stop reason: HOSPADM

## 2024-08-28 RX ORDER — ACETAMINOPHEN 325 MG/1
650 TABLET ORAL EVERY 4 HOURS PRN
Status: DISCONTINUED | OUTPATIENT
Start: 2024-08-28 | End: 2024-08-30 | Stop reason: HOSPADM

## 2024-08-28 RX ADMIN — ACETAMINOPHEN 325MG 650 MG: 325 TABLET ORAL at 22:47

## 2024-08-28 RX ADMIN — Medication 10 ML: at 14:11

## 2024-08-28 RX ADMIN — NITROGLYCERIN 1 INCH: 20 OINTMENT TOPICAL at 18:40

## 2024-08-28 RX ADMIN — Medication 10 ML: at 20:13

## 2024-08-28 RX ADMIN — FAMOTIDINE 20 MG: 20 TABLET, FILM COATED ORAL at 20:12

## 2024-08-28 RX ADMIN — HEPARIN SODIUM 3500 UNITS: 5000 INJECTION INTRAVENOUS; SUBCUTANEOUS at 23:03

## 2024-08-28 RX ADMIN — HEPARIN SODIUM 4000 UNITS: 5000 INJECTION INTRAVENOUS; SUBCUTANEOUS at 14:54

## 2024-08-28 RX ADMIN — HEPARIN SODIUM 8.7 UNITS/KG/HR: 10000 INJECTION, SOLUTION INTRAVENOUS at 14:52

## 2024-08-28 NOTE — CONSULTS
Internal medicine consult    Referring physician  Dr. BA    Chief complaint  Chest pain    Reason for consult  Follow medical problems    History of present illness  53-year-old white female with history of osteoarthritis degenerative disease hypertension obstruct sleep apnea and TIA in the past admitted to cardiology service with complaint of chest pain and abnormal stress Cardiolite for cardiac catheterization on Friday.  I am asked to follow patient for medical problems.  Patient denies any chest pain shortness of breath palpitation at the time of examination.  Patient denies any fever cough congestion night sweats weight loss or weight gain.    Past Medical History:  Hypertension  Osteoarthritis  Degenerative disease  Anxiety disorder  Depression  Obstructive sleep apnea     Past Surgical History:              Procedure Laterality Date    APPENDECTOMY        BILE DUCT STENT PLACEMENT        COLONOSCOPY        ENDOMETRIAL ABLATION W/ NOVASURE        LAPAROSCOPIC CHOLECYSTECTOMY        LAPAROTOMY OOPHERECTOMY Right      SALPINGECTOMY Right      TRANSVAGINAL TAPING SUSPENSION N/A 9/29/2020     Procedure: SLING OPERATION, FEMALE;  Surgeon: Rodriguez Castellanos Jr., MD;  Location: [facility];  Service: Urology;  Laterality: N/A;        Social History:               Tobacco Use    Smoking status: Former       Current packs/day: 1.00       Average packs/day: 1 pack/day for 2.7 years (2.7 ttl pk-yrs)       Types: Cigarettes       Start date: 1978    Smokeless tobacco: Never   Substance Use Topics    Alcohol use: Yes       Comment: twice per year      Family History:           Problem Relation Age of Onset    Heart disease Mother      Heart disease Father      Malig Hyperthermia Neg Hx        Allergies:             Allergen Reactions    Naproxen Hives         Home Meds: Reviewed    Review of Systems:  Constitutional: No weakness,fatigue, fever, chills   Eyes: No eye pain, vision changes   ENT/oropharynx: No difficulty  "swallowing, no epistaxis, no changes in hearing   Cardiovascular: No chest pain, chest tightness, palpitations, paroxysmal nocturnal dyspnea, orthopnea, diaphoresis, dizziness / syncopal episode   Respiratory: No shortness of breath, dyspnea on exertion, cough, wheezing hemoptysis   Gastrointestinal: No abdominal pain, nausea, vomiting, diarrhea, bloody stools   Genitourinary: No hematuria, dysuria   Neurological: No headache, numbness, tingling, one-sided weakness    Musculoskeletal: No cramps, joint pain   Integument: No rash, edema      Physical Exam:  Blood pressure 124/75, pulse 75, temperature 98.5 °F (36.9 °C), temperature source Oral, resp. rate 16, height 160 cm (63\"), weight 115 kg (253 lb 3.2 oz), SpO2 94%, not currently breastfeeding.    General: Awake and alert in no distress   HEENT: Unremarkable  Respiratory: Normal effort and moving air bilaterally  Cardiovascular: S1S2 Regular rate and rhythm. No murmur, rubs or gallop.   Gastrointestinal: Soft nontender nondistended bowel sounds positive  Musculoskeletal: SIERRA x4. No abnormal movements  Extremities: No digital clubbing or cyanosis  Skin: Color pink. Skin warm and dry to touch. No rashes  No xanthoma  Neuro: AAO x3 CN II-XII grossly intact     LABS  Lab Results (last 24 hours)       Procedure Component Value Units Date/Time    BNP [[id number]]  (Normal) Collected: 08/28/24 1329    Specimen: Blood Updated: 08/28/24 1430     proBNP <36.0 pg/mL     Narrative:      This assay is used as an aid in the diagnosis of individuals suspected of having heart failure. It can be used as an aid in the diagnosis of acute decompensated heart failure (ADHF) in patients presenting with signs and symptoms of ADHF to the emergency department (ED). In addition, NT-proBNP of <300 pg/mL indicates ADHF is not likely.    Age Range Result Interpretation  NT-proBNP Concentration (pg/mL:      <50             Positive            >450                   [place]                 " 300-450                    Negative             <300    50-75           Positive            >900                  Gray                300-900                  Negative            <300      >75             Positive            >1800                  Gray                300-1800                  Negative            <300    High Sensitivity Troponin T [[id number]]  (Normal) Collected: 08/28/24 1329    Specimen: Blood Updated: 08/28/24 1430     HS Troponin T 6 ng/L     Narrative:      High Sensitive Troponin T Reference Range:  <14.0 ng/L- Negative Female for AMI  <22.0 ng/L- Negative Male for AMI  >=14 - Abnormal Female indicating possible myocardial injury.  >=22 - Abnormal Male indicating possible myocardial injury.   Clinicians would have to utilize clinical acumen, EKG, Troponin, and serial changes to determine if it is an Acute Myocardial Infarction or myocardial injury due to an underlying chronic condition.         TSH [[id number]]  (Normal) Collected: 08/28/24 1329    Specimen: Blood Updated: 08/28/24 1430     TSH 1.140 uIU/mL     Comprehensive Metabolic Panel [[id number]] Collected: 08/28/24 1329    Specimen: Blood Updated: 08/28/24 1423     Glucose 96 mg/dL      BUN 16 mg/dL      Creatinine 0.85 mg/dL      Sodium 139 mmol/L      Potassium 3.8 mmol/L      Chloride 105 mmol/L      CO2 25.0 mmol/L      Calcium 9.2 mg/dL      Total Protein 6.1 g/dL      Albumin 4.1 g/dL      ALT (SGPT) 27 U/L      AST (SGOT) 18 U/L      Alkaline Phosphatase 90 U/L      Total Bilirubin 0.6 mg/dL      Globulin 2.0 gm/dL      A/G Ratio 2.1 g/dL      BUN/Creatinine Ratio 18.8     Anion Gap 9.0 mmol/L      eGFR 81.0 mL/min/1.73     Narrative:      GFR Normal >60  Chronic Kidney Disease <60  Kidney Failure <15      Magnesium [[id number]]  (Normal) Collected: 08/28/24 1329    Specimen: Blood Updated: 08/28/24 1423     Magnesium 2.1 mg/dL     aPTT [[id number]]  (Normal) Collected: 08/28/24 1329    Specimen: Blood Updated: 08/28/24 1411     PTT  27.3 seconds     Protime-INR [[id number]]  (Normal) Collected: 08/28/24 1329    Specimen: Blood Updated: 08/28/24 1411     Protime 13.9 Seconds      INR 1.05    CBC Auto Differential [[id number]]  (Abnormal) Collected: 08/28/24 1329    Specimen: Blood Updated: 08/28/24 1405     WBC 8.74 10*3/mm3      RBC 4.78 10*6/mm3      Hemoglobin 13.8 g/dL      Hematocrit 42.2 %      MCV 88.3 fL      MCH 28.9 pg      MCHC 32.7 g/dL      RDW 12.5 %      RDW-SD 40.6 fl      MPV 11.9 fL      Platelets 211 10*3/mm3      Neutrophil % 38.9 %      Lymphocyte % 51.0 %      Monocyte % 8.0 %      Eosinophil % 1.4 %      Basophil % 0.5 %      Immature Grans % 0.2 %      Neutrophils, Absolute 3.40 10*3/mm3      Lymphocytes, Absolute 4.46 10*3/mm3      Monocytes, Absolute 0.70 10*3/mm3      Eosinophils, Absolute 0.12 10*3/mm3      Basophils, Absolute 0.04 10*3/mm3      Immature Grans, Absolute 0.02 10*3/mm3      nRBC 0.0 /100 WBC           Imaging Results (Last 24 Hours)       ** No results found for the last 24 hours. **          Scan on 8/28/2024 1339 by [employer], [place]: ECG 12-LEAD         Author: -- Service: -- Author Type: --   Filed: Date of Service: Creation Time:   Status: (Other)   HEART RATE=74  bpm  RR Xbjlhhlr=509  ms  SD Ptiuyiou=766  ms  P Horizontal Axis=-1  deg  P Front Axis=58  deg  QRSD Interval=82  ms  QT Vzqjpmue=696  ms  RAtC=378  ms  QRS Axis=26  deg  T Wave Axis=65  deg  - OTHERWISE NORMAL ECG -  Sinus rhythm  Low voltage, precordial leads          Current Facility-Administered Medications:     busPIRone (BUSPAR) tablet 15 mg, 15 mg, Oral, TID, Christiano Zambrano MD    Calcium Replacement - Follow Nurse / BPA Driven Protocol, , Does not apply, PRN, Mey Álvarez, APRN    [START ON 8/29/2024] clopidogrel (PLAVIX) tablet 75 mg, 75 mg, Oral, Daily, Christiano Zambrano MD    famotidine (PEPCID) tablet 20 mg, 20 mg, Oral, Q12H, Christiano Zambrano MD    [START ON 8/29/2024] FLUoxetine (PROzac) capsule 60 mg,  60 mg, Oral, Juan Manuel ALEXANDRE Shanker, MD    heparin (porcine) 5000 UNIT/ML injection 3,500-4,000 Units, 30-34.8 Units/kg, Intravenous, Q6H PRN, Mey Álvarez APRN    heparin 65243 units/250 mL (100 units/mL) in 0.45 % NaCl infusion, 8.7 Units/kg/hr, Intravenous, Titrated, Mey Álvarez APRN, Last Rate: 10 mL/hr at 08/28/24 1452, 8.7 Units/kg/hr at 08/28/24 1452    [START ON 8/29/2024] losartan (COZAAR) tablet 100 mg, 100 mg, Oral, Juan Manuel ALEXANDRE Shanker, MD    Magnesium Standard Dose Replacement - Follow Nurse / BPA Driven Protocol, , Does not apply, PRN, Vinicio Álvareza L, APRN    [START ON 8/29/2024] metoprolol succinate XL (TOPROL-XL) 24 hr tablet 25 mg, 25 mg, Oral, Daily, Christiano Zambrano MD    nitroglycerin (NITROSTAT) ointment 1 inch, 1 inch, Topical, TID - Nitrates, Mey Álvarez, APRN    nitroglycerin (NITROSTAT) SL tablet 0.4 mg, 0.4 mg, Sublingual, Q5 Min PRN, Preeti Mey L, APRN    Phosphorus Replacement - Follow Nurse / BPA Driven Protocol, , Does not apply, PRN, Peevrandall Mey L, APRN    Potassium Replacement - Follow Nurse / BPA Driven Protocol, , Does not apply, PRN, Peevrandall Mey L, APRN    sodium chloride 0.9 % flush 10 mL, 10 mL, Intravenous, Q12H, Peevrandall Mey L, APRN, 10 mL at 08/28/24 1411    sodium chloride 0.9 % flush 10 mL, 10 mL, Intravenous, PRN, Peevrandall Mey L, APRN    sodium chloride 0.9 % infusion 40 mL, 40 mL, Intravenous, PRN, Peevey Mey L, APRN     ASSESSMENT  Chest pain with positive stress Cardiolite admitted for cardiac catheterization  Hypertension  TIA  Osteoarthritis  Degenerative disc disease  Morbidly obese  Obstruct sleep apnea  Gastroesophageal reflux disease    PLAN  Agree with current care  Heparin per cardiology  Cardiac catheterization Friday  Continue home medications  Stress ulcer DVT prophylaxis  Repeat labs in a.m.  Supportive care  Patient is full code  Discussed with family and nursing staff  Will follow with Dr. BA and further  recommendation current hospital course    DOMENICO RIBEIRO MD

## 2024-08-28 NOTE — H&P
[facility]  History & Physical Note                                                                                    Patient Identification:  Tejas Gonsales:   53 y.o.  female  1970  [id number]            No chief complaint on file.      Date of Admission:8/28/24    Admitting Physician: dr Zambrano    Reason for Admission:Chest Pain, No chief complaint on file.      History of Present Illness:     This is a 53 year old female who is known with our service with hypertension, sleep apnea, hx stroke. She presented to [facility] er with chest pain. She had abnormal stress test on 8/26/24 and was scheduled in office today for echo. Troponin at [facility] negative, ecg without ischemic changes. She was transferred to [facility] for further management.         Cardiac Risk Factors:htn    Past Medical History:  Past Medical History:   Diagnosis Date    Anxiety     DDD (degenerative disc disease), lumbar     Depression     History of pancreatitis     History of recent fall     Hypertension     Osteoarthritis     Phlebitis     BILATERAL LOWER EXTREMITY WITH PREGNANCY    Sleep apnea     CPAP    Stress incontinence in female     Stroke     TIA (transient ischemic attack)     at least 3 stable    Past Surgical History:  Past Surgical History:   Procedure Laterality Date    APPENDECTOMY      BILE DUCT STENT PLACEMENT      COLONOSCOPY      ENDOMETRIAL ABLATION W/ NOVASURE      LAPAROSCOPIC CHOLECYSTECTOMY      LAPAROTOMY OOPHERECTOMY Right     SALPINGECTOMY Right     TRANSVAGINAL TAPING SUSPENSION N/A 9/29/2020    Procedure: SLING OPERATION, FEMALE;  Surgeon: Rodriguez Castellanos Jr., MD;  Location: [facility];  Service: Urology;  Laterality: N/A;        Social History:   Social History     Tobacco Use    Smoking status: Former     Current packs/day: 1.00     Average packs/day: 1 pack/day for 2.7 years (2.7 ttl pk-yrs)     Types: Cigarettes     Start date: 1978    Smokeless tobacco: Never   Substance Use Topics     Alcohol use: Yes     Comment: twice per year        Family History:  Family History   Problem Relation Age of Onset    Heart disease Mother     Heart disease Father     Malig Hyperthermia Neg Hx           Allergies:  Allergies   Allergen Reactions    Naproxen Hives       Home Meds:  No current facility-administered medications on file prior to encounter.     Current Outpatient Medications on File Prior to Encounter   Medication Sig Dispense Refill    busPIRone (BUSPAR) 15 MG tablet Take 1 tablet by mouth 3 (Three) Times a Day.      clopidogrel (PLAVIX) 75 MG tablet Take 1 tablet by mouth Daily. PT HOLDING FOR SURGERY/PT STOPPED TAKING ON OWN, INSTRUCTED TO CONTACT PCP REGARDING HOLDING PRIOR TO SURGERY      FLUoxetine (PROzac) 20 MG capsule Take 3 capsules by mouth Every Morning.      lisinopril (PRINIVIL,ZESTRIL) 10 MG tablet Take 1 tablet by mouth Daily.      losartan (COZAAR) 100 MG tablet Take 1 tablet by mouth Every Morning.      metoprolol succinate XL (TOPROL-XL) 25 MG 24 hr tablet Take 1 tablet by mouth Daily.      famotidine (PEPCID) 20 MG tablet Take 1 tablet by mouth Every 12 (Twelve) Hours.      LORazepam (ATIVAN) 1 MG tablet Take 1 tablet by mouth Every 8 (Eight) Hours As Needed for Anxiety.           Scheduled Meds:  busPIRone, 15 mg, TID  [START ON 8/29/2024] clopidogrel, 75 mg, Daily  famotidine, 20 mg, Q12H  [START ON 8/29/2024] FLUoxetine, 60 mg, QAM  [START ON 8/29/2024] losartan, 100 mg, QAM  [START ON 8/29/2024] metoprolol succinate XL, 25 mg, Daily  nitroglycerin, 1 inch, TID - Nitrates  sodium chloride, 10 mL, Q12H            INTAKE AND OUTPUT:  No intake or output data in the 24 hours ending 08/28/24 1521        Review of Systems  Constitutional: No wt loss, fever   Gastrointestinal: No nausea , abdominal pain  Behavioral/Psych: No insomnia or anxiety   Cardiovascular ----positive for chest pain. All other systems reviewed and are negative                 Physical Exam  /75 (BP  "Location: Right arm, Patient Position: Lying)   Pulse 75   Temp 98.5 °F (36.9 °C) (Oral)   Resp 16   Ht 160 cm (63\")   Wt 115 kg (253 lb 3.2 oz)   SpO2 94%   BMI 44.85 kg/m²     General appearance: No acute changes   Eyes: Sclerae conjunctivae normal, pupils reactive   HENT: Atraumatic; oropharynx clear with moist mucous membranes and no mucosal ulcerations;  Neck: Trachea midline; NECK, supple, no thyromegaly or lymphadenopathy   Lungs: Normal size and shape, normal breath sounds, equal distribution of air, no rales and rhonchi   CV: S1-S2 regular, no murmurs, no rub, no gallop   Abdomen: Soft, nontender; no masses , no abnormal abdominal sounds   Extremities: No deformity , normal color , no peripheral edema   Skin: Normal temperature, turgor and texture; no rash, ulcers  Psych: Appropriate affect, alert and oriented to person, place and time                                Cardiographics    ECG:           Telemetry:      ECHO:      Lab Review:  Results from last 7 days   Lab Units 08/28/24  1329   HSTROP T ng/L 6     Results from last 7 days   Lab Units 08/28/24  1329   MAGNESIUM mg/dL 2.1     Results from last 7 days   Lab Units 08/28/24  1329   SODIUM mmol/L 139   POTASSIUM mmol/L 3.8   BUN mg/dL 16   CREATININE mg/dL 0.85   CALCIUM mg/dL 9.2     @LABRCNTbnp@  Results from last 7 days   Lab Units 08/28/24  1329   WBC 10*3/mm3 8.74   HEMOGLOBIN g/dL 13.8   HEMATOCRIT % 42.2   PLATELETS 10*3/mm3 211     Results from last 7 days   Lab Units 08/28/24  1329 08/28/24  0657   INR  1.05 1.0   APTT seconds 27.3 36.6*         CXR:       Assessment / Plan:  Chest pain  Abnormal stress test  Hypertension  Hx stroke      Recommendations:  This is a 53 year old female known with our service admitted for chest pain. She had abnormal stress test on 8/26/24, was seen in office at that time with plans for outpatient C. She presented to [facility] ER with chest pain today, treated with ntg that resolved pain. Check surveillance " "labs, trop, ecg. Check echo. Will plan for LHC on Friday. Start heparin gtt, she is on plavix at home, resume. Resume home meds, consult IM. Bmp in am.       Mey Álvarez, APRN  8/28/2024  15:21 EDT  53-year-old female admitted with the recurrent episodes of chest pains on retrosternal had a recent stress test which was positive will need diagnostic heart catheterization    Procedure, risks and options of cardiac cath explained to pt INCLUDING BUT NOT LIMITED TO MI, STROKE, DEATH, INFECTION HAEMORRHAGE, . Pt understands well and agrees with no further questions.  Christiano Zambrano MD    EMR Dragon/Transcription:   \"Dictated utilizing Dragon dictation\".     "

## 2024-08-28 NOTE — Clinical Note
Hemostasis started on the right radial artery. R-Band was used in achieving hemostasis. Radial compression device applied to vessel. Hemostasis achieved successfully. Closure device additional comment: TR band with 14 cc of air

## 2024-08-28 NOTE — PLAN OF CARE
Goal Outcome Evaluation:         Pt is SR on tele. HR in the 70s. On room air. Pt is direct admit. No complaints of chest pain as of the present. NPO at night per order

## 2024-08-29 ENCOUNTER — APPOINTMENT (OUTPATIENT)
Dept: CARDIOLOGY | Facility: HOSPITAL | Age: 54
End: 2024-08-29
Payer: COMMERCIAL

## 2024-08-29 PROBLEM — R07.9 CHEST PAIN: Status: ACTIVE | Noted: 2024-08-29

## 2024-08-29 LAB
ALBUMIN SERPL-MCNC: 3.8 G/DL (ref 3.5–5.2)
ALBUMIN/GLOB SERPL: 1.7 G/DL
ALP SERPL-CCNC: 80 U/L (ref 39–117)
ALT SERPL W P-5'-P-CCNC: 27 U/L (ref 1–33)
ANION GAP SERPL CALCULATED.3IONS-SCNC: 9.6 MMOL/L (ref 5–15)
AORTIC ARCH: 2.3 CM
AORTIC DIMENSIONLESS INDEX: 0.8 (DI)
APTT PPP: 47.9 SECONDS (ref 22.7–35.4)
APTT PPP: 70.9 SECONDS (ref 22.7–35.4)
APTT PPP: 91.2 SECONDS (ref 22.7–35.4)
APTT PPP: 92.8 SECONDS (ref 22.7–35.4)
ASCENDING AORTA: 3.2 CM
AST SERPL-CCNC: 18 U/L (ref 1–32)
BASOPHILS # BLD AUTO: 0.07 10*3/MM3 (ref 0–0.2)
BASOPHILS NFR BLD AUTO: 0.8 % (ref 0–1.5)
BH CV ECHO MEAS - ACS: 2.06 CM
BH CV ECHO MEAS - AO MAX PG: 6.4 MMHG
BH CV ECHO MEAS - AO MEAN PG: 3 MMHG
BH CV ECHO MEAS - AO ROOT DIAM: 2.5 CM
BH CV ECHO MEAS - AO V2 MAX: 126 CM/SEC
BH CV ECHO MEAS - AO V2 VTI: 27.3 CM
BH CV ECHO MEAS - AVA(I,D): 2.46 CM2
BH CV ECHO MEAS - EDV(CUBED): 88.3 ML
BH CV ECHO MEAS - EDV(MOD-SP2): 95 ML
BH CV ECHO MEAS - EDV(MOD-SP4): 102 ML
BH CV ECHO MEAS - EF(MOD-BP): 67.2 %
BH CV ECHO MEAS - EF(MOD-SP2): 66.3 %
BH CV ECHO MEAS - EF(MOD-SP4): 70.6 %
BH CV ECHO MEAS - ESV(CUBED): 25.3 ML
BH CV ECHO MEAS - ESV(MOD-SP2): 32 ML
BH CV ECHO MEAS - ESV(MOD-SP4): 30 ML
BH CV ECHO MEAS - FS: 34.1 %
BH CV ECHO MEAS - IVS/LVPW: 0.99 CM
BH CV ECHO MEAS - IVSD: 0.91 CM
BH CV ECHO MEAS - LAT PEAK E' VEL: 10.8 CM/SEC
BH CV ECHO MEAS - LV MASS(C)D: 133 GRAMS
BH CV ECHO MEAS - LV MAX PG: 3.5 MMHG
BH CV ECHO MEAS - LV MEAN PG: 2 MMHG
BH CV ECHO MEAS - LV V1 MAX: 94.1 CM/SEC
BH CV ECHO MEAS - LV V1 VTI: 20.7 CM
BH CV ECHO MEAS - LVIDD: 4.5 CM
BH CV ECHO MEAS - LVIDS: 2.9 CM
BH CV ECHO MEAS - LVOT AREA: 3.2 CM2
BH CV ECHO MEAS - LVOT DIAM: 2.03 CM
BH CV ECHO MEAS - LVPWD: 0.92 CM
BH CV ECHO MEAS - MED PEAK E' VEL: 7.1 CM/SEC
BH CV ECHO MEAS - MV A DUR: 0.11 SEC
BH CV ECHO MEAS - MV A MAX VEL: 72.3 CM/SEC
BH CV ECHO MEAS - MV DEC SLOPE: 499.1 CM/SEC2
BH CV ECHO MEAS - MV DEC TIME: 0.23 SEC
BH CV ECHO MEAS - MV E MAX VEL: 76.6 CM/SEC
BH CV ECHO MEAS - MV E/A: 1.06
BH CV ECHO MEAS - MV MAX PG: 3.1 MMHG
BH CV ECHO MEAS - MV MEAN PG: 1.17 MMHG
BH CV ECHO MEAS - MV P1/2T: 52.6 MSEC
BH CV ECHO MEAS - MV V2 VTI: 25.1 CM
BH CV ECHO MEAS - MVA(P1/2T): 4.2 CM2
BH CV ECHO MEAS - MVA(VTI): 2.7 CM2
BH CV ECHO MEAS - PA ACC TIME: 0.11 SEC
BH CV ECHO MEAS - PA V2 MAX: 74.7 CM/SEC
BH CV ECHO MEAS - PI END-D VEL: 74.7 CM/SEC
BH CV ECHO MEAS - PULM A REVS DUR: 0.12 SEC
BH CV ECHO MEAS - PULM A REVS VEL: 33.5 CM/SEC
BH CV ECHO MEAS - PULM DIAS VEL: 28.5 CM/SEC
BH CV ECHO MEAS - PULM S/D: 1.53
BH CV ECHO MEAS - PULM SYS VEL: 43.6 CM/SEC
BH CV ECHO MEAS - RAP SYSTOLE: 3 MMHG
BH CV ECHO MEAS - RV MAX PG: 2.07 MMHG
BH CV ECHO MEAS - RV V1 MAX: 72 CM/SEC
BH CV ECHO MEAS - RV V1 VTI: 19.1 CM
BH CV ECHO MEAS - RVSP: 7 MMHG
BH CV ECHO MEAS - SUP REN AO DIAM: 2.2 CM
BH CV ECHO MEAS - SV(LVOT): 67.1 ML
BH CV ECHO MEAS - SV(MOD-SP2): 63 ML
BH CV ECHO MEAS - SV(MOD-SP4): 72 ML
BH CV ECHO MEAS - TR MAX PG: 4.1 MMHG
BH CV ECHO MEAS - TR MAX VEL: 101.8 CM/SEC
BH CV ECHO MEASUREMENTS AVERAGE E/E' RATIO: 8.56
BH CV STRESS BP STAGE 1: NORMAL
BH CV STRESS BP STAGE 2: NORMAL
BH CV STRESS DURATION MIN STAGE 1: 5
BH CV STRESS DURATION MIN STAGE 2: 3
BH CV STRESS DURATION SEC STAGE 1: 5
BH CV STRESS DURATION SEC STAGE 2: 8
BH CV STRESS GRADE STAGE 1: 10
BH CV STRESS GRADE STAGE 2: 12
BH CV STRESS HR STAGE 1: 124
BH CV STRESS HR STAGE 2: 150
BH CV STRESS METS STAGE 1: 4.6
BH CV STRESS METS STAGE 2: 6.3
BH CV STRESS O2 STAGE 2: 98
BH CV STRESS PROTOCOL 1: NORMAL
BH CV STRESS RECOVERY BP: NORMAL MMHG
BH CV STRESS RECOVERY HR: 106 BPM
BH CV STRESS RECOVERY O2: 98 %
BH CV STRESS SPEED STAGE 1: 1.7
BH CV STRESS SPEED STAGE 2: 2.2
BH CV STRESS STAGE 1: 1
BH CV STRESS STAGE 2: 2
BH CV XLRA - RV BASE: 3.3 CM
BH CV XLRA - RV LENGTH: 6 CM
BH CV XLRA - RV MID: 2.7 CM
BILIRUB SERPL-MCNC: 0.4 MG/DL (ref 0–1.2)
BUN SERPL-MCNC: 19 MG/DL (ref 6–20)
BUN/CREAT SERPL: 20.4 (ref 7–25)
CALCIUM SPEC-SCNC: 8.8 MG/DL (ref 8.6–10.5)
CHLORIDE SERPL-SCNC: 106 MMOL/L (ref 98–107)
CHOLEST SERPL-MCNC: 140 MG/DL (ref 0–200)
CO2 SERPL-SCNC: 25.4 MMOL/L (ref 22–29)
CREAT SERPL-MCNC: 0.93 MG/DL (ref 0.57–1)
DEPRECATED RDW RBC AUTO: 40.6 FL (ref 37–54)
EGFRCR SERPLBLD CKD-EPI 2021: 72.7 ML/MIN/1.73
EOSINOPHIL # BLD AUTO: 0.15 10*3/MM3 (ref 0–0.4)
EOSINOPHIL NFR BLD AUTO: 1.7 % (ref 0.3–6.2)
ERYTHROCYTE [DISTWIDTH] IN BLOOD BY AUTOMATED COUNT: 12.7 % (ref 12.3–15.4)
GLOBULIN UR ELPH-MCNC: 2.2 GM/DL
GLUCOSE SERPL-MCNC: 112 MG/DL (ref 65–99)
HBA1C MFR BLD: 6 % (ref 4.8–5.6)
HCT VFR BLD AUTO: 40.4 % (ref 34–46.6)
HDLC SERPL-MCNC: 37 MG/DL (ref 40–60)
HGB BLD-MCNC: 13.5 G/DL (ref 12–15.9)
IMM GRANULOCYTES # BLD AUTO: 0.03 10*3/MM3 (ref 0–0.05)
IMM GRANULOCYTES NFR BLD AUTO: 0.3 % (ref 0–0.5)
LDLC SERPL CALC-MCNC: 71 MG/DL (ref 0–100)
LDLC/HDLC SERPL: 1.74 {RATIO}
LEFT ATRIUM VOLUME INDEX: 19.1 ML/M2
LYMPHOCYTES # BLD AUTO: 3.81 10*3/MM3 (ref 0.7–3.1)
LYMPHOCYTES NFR BLD AUTO: 44 % (ref 19.6–45.3)
MAXIMAL PREDICTED HEART RATE: 165 BPM
MCH RBC QN AUTO: 29.4 PG (ref 26.6–33)
MCHC RBC AUTO-ENTMCNC: 33.4 G/DL (ref 31.5–35.7)
MCV RBC AUTO: 88 FL (ref 79–97)
MONOCYTES # BLD AUTO: 0.76 10*3/MM3 (ref 0.1–0.9)
MONOCYTES NFR BLD AUTO: 8.8 % (ref 5–12)
NEUTROPHILS NFR BLD AUTO: 3.84 10*3/MM3 (ref 1.7–7)
NEUTROPHILS NFR BLD AUTO: 44.4 % (ref 42.7–76)
NRBC BLD AUTO-RTO: 0 /100 WBC (ref 0–0.2)
PERCENT MAX PREDICTED HR: 90.91 %
PLATELET # BLD AUTO: 179 10*3/MM3 (ref 140–450)
PMV BLD AUTO: 11.6 FL (ref 6–12)
POTASSIUM SERPL-SCNC: 3.8 MMOL/L (ref 3.5–5.2)
PROT SERPL-MCNC: 6 G/DL (ref 6–8.5)
RBC # BLD AUTO: 4.59 10*6/MM3 (ref 3.77–5.28)
SINUS: 3 CM
SODIUM SERPL-SCNC: 141 MMOL/L (ref 136–145)
STJ: 2.6 CM
STRESS BASELINE BP: NORMAL MMHG
STRESS BASELINE HR: 86 BPM
STRESS O2 SAT REST: 97 %
STRESS PERCENT HR: 107 %
STRESS POST ESTIMATED WORKLOAD: 6.3 METS
STRESS POST EXERCISE DUR MIN: 8 MIN
STRESS POST EXERCISE DUR SEC: 13 SEC
STRESS POST PEAK BP: NORMAL MMHG
STRESS POST PEAK HR: 150 BPM
STRESS TARGET HR: 140 BPM
TRIGL SERPL-MCNC: 193 MG/DL (ref 0–150)
TROPONIN T SERPL HS-MCNC: <6 NG/L
TSH SERPL DL<=0.05 MIU/L-ACNC: 1.17 UIU/ML (ref 0.27–4.2)
VLDLC SERPL-MCNC: 32 MG/DL (ref 5–40)
WBC NRBC COR # BLD AUTO: 8.66 10*3/MM3 (ref 3.4–10.8)

## 2024-08-29 PROCEDURE — G0378 HOSPITAL OBSERVATION PER HR: HCPCS

## 2024-08-29 PROCEDURE — 80061 LIPID PANEL: CPT

## 2024-08-29 PROCEDURE — 83036 HEMOGLOBIN GLYCOSYLATED A1C: CPT | Performed by: HOSPITALIST

## 2024-08-29 PROCEDURE — 96376 TX/PRO/DX INJ SAME DRUG ADON: CPT

## 2024-08-29 PROCEDURE — 96366 THER/PROPH/DIAG IV INF ADDON: CPT

## 2024-08-29 PROCEDURE — 84443 ASSAY THYROID STIM HORMONE: CPT | Performed by: HOSPITALIST

## 2024-08-29 PROCEDURE — 25510000001 PERFLUTREN 6.52 MG/ML SUSPENSION 2 ML VIAL

## 2024-08-29 PROCEDURE — 99214 OFFICE O/P EST MOD 30 MIN: CPT | Performed by: NURSE PRACTITIONER

## 2024-08-29 PROCEDURE — 25010000002 HEPARIN (PORCINE) 25000-0.45 UT/250ML-% SOLUTION

## 2024-08-29 PROCEDURE — 84484 ASSAY OF TROPONIN QUANT: CPT

## 2024-08-29 PROCEDURE — 85730 THROMBOPLASTIN TIME PARTIAL: CPT | Performed by: INTERNAL MEDICINE

## 2024-08-29 PROCEDURE — 25010000002 HEPARIN (PORCINE) PER 1000 UNITS

## 2024-08-29 PROCEDURE — 80053 COMPREHEN METABOLIC PANEL: CPT | Performed by: HOSPITALIST

## 2024-08-29 PROCEDURE — 85025 COMPLETE CBC W/AUTO DIFF WBC: CPT

## 2024-08-29 PROCEDURE — 93306 TTE W/DOPPLER COMPLETE: CPT | Performed by: INTERNAL MEDICINE

## 2024-08-29 PROCEDURE — 93306 TTE W/DOPPLER COMPLETE: CPT

## 2024-08-29 RX ADMIN — FLUOXETINE HYDROCHLORIDE 60 MG: 20 CAPSULE ORAL at 06:13

## 2024-08-29 RX ADMIN — LOSARTAN POTASSIUM 100 MG: 100 TABLET, FILM COATED ORAL at 06:13

## 2024-08-29 RX ADMIN — Medication 10 ML: at 09:10

## 2024-08-29 RX ADMIN — ACETAMINOPHEN 325MG 650 MG: 325 TABLET ORAL at 16:59

## 2024-08-29 RX ADMIN — ACETAMINOPHEN 325MG 650 MG: 325 TABLET ORAL at 21:58

## 2024-08-29 RX ADMIN — ACETAMINOPHEN 325MG 650 MG: 325 TABLET ORAL at 13:00

## 2024-08-29 RX ADMIN — NITROGLYCERIN 1 INCH: 20 OINTMENT TOPICAL at 13:00

## 2024-08-29 RX ADMIN — HEPARIN SODIUM 4000 UNITS: 5000 INJECTION INTRAVENOUS; SUBCUTANEOUS at 12:53

## 2024-08-29 RX ADMIN — METOPROLOL SUCCINATE 25 MG: 25 TABLET, EXTENDED RELEASE ORAL at 08:53

## 2024-08-29 RX ADMIN — NITROGLYCERIN 1 INCH: 20 OINTMENT TOPICAL at 16:59

## 2024-08-29 RX ADMIN — HEPARIN SODIUM 12.7 UNITS/KG/HR: 10000 INJECTION, SOLUTION INTRAVENOUS at 15:21

## 2024-08-29 RX ADMIN — PERFLUTREN 3 ML: 6.52 INJECTION, SUSPENSION INTRAVENOUS at 13:46

## 2024-08-29 RX ADMIN — CLOPIDOGREL BISULFATE 75 MG: 75 TABLET, FILM COATED ORAL at 08:53

## 2024-08-29 RX ADMIN — BUSPIRONE HYDROCHLORIDE 15 MG: 15 TABLET ORAL at 08:53

## 2024-08-29 RX ADMIN — FAMOTIDINE 20 MG: 20 TABLET, FILM COATED ORAL at 20:30

## 2024-08-29 RX ADMIN — NITROGLYCERIN 1 INCH: 20 OINTMENT TOPICAL at 08:53

## 2024-08-29 RX ADMIN — FAMOTIDINE 20 MG: 20 TABLET, FILM COATED ORAL at 08:53

## 2024-08-29 NOTE — PLAN OF CARE
Date of Service: 08/28/2018    REASON FOR CONSULTATION:  This is a consultation requested by Dr. Howe for pulmonology to provide risk assessment prior to having a urological procedure.  She is accompanied by her .    HISTORY OF PRESENT ILLNESS:  This is a 65-year-old female with a history of very severe COPD, known pulmonary nodule secondary to blastomycosis, chronic respiratory therapy and history of spontaneous pneumothorax who presents for pulmonary risk assessment prior to urological procedure.  She saw her primary physician and had a urinalysis that was concerning for microscopic hematuria.  She then had a repeat urinalysis, which was negative.  She had a CT scan of her abdomen, which showed multiple kidney stones. A procedure, assumed to be a lithotripsy will be scheduled.  Over the last 5 days she has developed left-sided flank pain.  She was prescribed muscle relaxers for this pain.  She does not have any pain medication.  She was last seen by Dr. Rubio in 04/2018.  Since being seen, she has not had any changes to her health.  She wears oxygen at 4 liters.  She alternates between continuous and pulse dosing.  She sometimes increases it to 6 liters.  She is not able to walk today due to her flank discomfort for an evaluation.  She is rarely using her Albuterol rescue inhaler.  Since being seen, she has not had any fever, chills or pulmonary infections.  Her activity has been stable.  She is on Eliquis for atrial fibrillation.  She also has stopped taking Calcium supplements.    PAST MEDICAL, SURGICAL HISTORY AND SOCIAL HISTORY:  Reviewed.    ALLERGIES:  She has no known drug allergies.  She does not have any seasonal allergies.    CURRENT MEDICATIONS:  Tylenol p.r.n.  Albuterol inhaler p.r.n.  Eliquis 5 mg every 12 hours.   Aspirin 81 mg daily.  Vitamin B complex.  Symbicort 160/4.5 two puffs twice a day.  Vitamin D 2000 units daily.  Mucinex DM not currently taking.  Cardizem- mg  Goal Outcome Evaluation:  Plan of Care Reviewed With: patient        Progress: no change  Outcome Evaluation: Pt is SR on tele. VSS; on room air. Pt has heparin gtt running per MD order. Pt is NPO at midnight for scheduled heart cath. Pt had complaints of HA r/t scheduled nitro paste, treated per MAR. Pt has no further complaints as of the present.                                daily.  HydroDIURIL 12.5 mg daily.  Levothyroxine 100 mcg daily.  Multivitamin daily.  Omega-3 Fish Oil 1200 mg daily.  Klor-Con 20 mEq daily.  Spiriva Respimat 2.5 mcg 2 puffs daily.    She is not taking any other over-the-counter medications.    PHYSICAL EXAMINATION:  VITAL SIGNS:  Weight 91 kilos, blood pressure 122/68, pulse is 89, respiratory rate is 20, pulse oximetry at rest on 4 liters is 97%.  GENERAL:  She appears her stated age.  She is well groomed.  She is in no acute distress, sitting.  HEENT:  Head is normocephalic.  Pupils are equal.  Sclerae without redness or icterus.  Posterior oropharynx is without redness or exudate.  Mallampati is 2.  Tympanic membranes are without redness or bulging.  NECK:  Supple.  Trachea is midline.  There are no lymph nodes felt.  HEART:  Regular rate and rhythm, no gallop or murmur.  LUNGS:  Sounds clear to auscultation, decreased at the bases.  No wheezing or rhonchi, no use of accessory muscles, able to speak in full sentences.  ABDOMEN:  Soft, nontender, no distention.  She does have left flank tenderness with light palpation.  EXTREMITIES:  Radial pulses are 2+.  No nail bed cyanosis, no clubbing.  There is no pitting lower leg edema.  There is no calf tenderness.    DIAGNOSTIC DATA:  PFTs done in 2014 showed a very severe obstructive defect with evidence of hyperinflation and gas trapping.  FEV1 was 0.7 (27% of predicted).  FVC is 2.54 (56% of predicted), FEV1 to FVC was 28%.  Arterial blood gases pH 7.44, pCO2 44, pO2 of 59 on room air.    IMPRESSION:  1.  Kidney stones.  She will be undergoing a urological procedure at [facility] underwent care of Dr. Howe.    2.  Preoperative pulmonary assessment.  Aristocrat score is 19. However due to pulmonary history she has a higher risk for pulmonary complications. This was discussed with her, but does not prevent her from proceeding. She does not need any further testing prior to procedure.  A pulmonary consult  should any difficulties occur would be recommended.  3.  Chronic hypoxemic respiratory failure.  She should continue her oxygen.  A future formal oxygen evaluation would be suggested when she is not having pain.  4.  Very severe chronic obstructive pulmonary disease.  The signs of exacerbation were reviewed, and she was encouraged to call for these for followup.  She should continue to use her Spiriva Respimat, Symbicort and p.r.n. bronchodilator as needed.  She is not smoking.  Follow up will be with Dr. Rubio in November.  5.  Health care maintenance.  She received Pneumococcal vaccination in 2010.  Prevnar was given in 2015.  We encouraged her to get an annual influenza vaccination.      Findings from this visit and plan of care were discussed with Dr. Rubio.    Arely MCWILLIAMS  Dictated By: POPPY Coleman  Signing Provider: Provider Self-Sign,     MARY/amy ([id number])  DD: 08/28/2018 15:47:39 TD: 08/29/2018 09:10:34    Copy Sent To:     cc: Aaron Rubio MD

## 2024-08-29 NOTE — PROGRESS NOTES
"Daily progress note    Referring physician  Dr. BA    Subjective  Awake and alert with no new complaints and denies any chest pain shortness of breath palpitation    History of present illness  53-year-old white female with history of osteoarthritis degenerative disease hypertension obstruct sleep apnea and TIA in the past admitted to cardiology service with complaint of chest pain and abnormal stress Cardiolite for cardiac catheterization on Friday.  I am asked to follow patient for medical problems.  Patient denies any chest pain shortness of breath palpitation at the time of examination.  Patient denies any fever cough congestion night sweats weight loss or weight gain.    Review of Systems:  Constitutional: No weakness,fatigue, fever, chills   Eyes: No eye pain, vision changes   ENT/oropharynx: No difficulty swallowing, no epistaxis, no changes in hearing   Cardiovascular: No chest pain, chest tightness, palpitations, paroxysmal nocturnal dyspnea, orthopnea, diaphoresis, dizziness / syncopal episode   Respiratory: No shortness of breath, dyspnea on exertion, cough, wheezing hemoptysis   Gastrointestinal: No abdominal pain, nausea, vomiting, diarrhea, bloody stools   Genitourinary: No hematuria, dysuria   Neurological: No headache, numbness, tingling, one-sided weakness    Musculoskeletal: No cramps, joint pain   Integument: No rash, edema      Physical Exam:  Blood pressure 142/76, pulse 68, temperature 98 °F (36.7 °C), temperature source Oral, resp. rate 18, height 160 cm (63\"), weight 115 kg (253 lb), SpO2 98%, not currently breastfeeding.    General: Awake and alert in no distress   HEENT: Unremarkable  Respiratory: Normal effort and moving air bilaterally  Cardiovascular: S1S2 Regular rate and rhythm. No murmur, rubs or gallop.   Gastrointestinal: Soft nontender nondistended bowel sounds positive  Musculoskeletal: SIERRA x4. No abnormal movements  Extremities: No digital clubbing or cyanosis  Skin: Color pink. " Skin warm and dry to touch. No rashes  No xanthoma  Neuro: AAO x3 CN II-XII grossly intact     LABS  Lab Results (last 24 hours)       Procedure Component Value Units Date/Time    aPTT [[id number]]  (Abnormal) Collected: 08/29/24 1107    Specimen: Blood from Arm, Right Updated: 08/29/24 1242     PTT 47.9 seconds     aPTT [[id number]]  (Abnormal) Collected: 08/29/24 0454    Specimen: Blood Updated: 08/29/24 0544     PTT 70.9 seconds     High Sensitivity Troponin T [[id number]]  (Normal) Collected: 08/29/24 0454    Specimen: Blood Updated: 08/29/24 0539     HS Troponin T <6 ng/L     Narrative:      High Sensitive Troponin T Reference Range:  <14.0 ng/L- Negative Female for AMI  <22.0 ng/L- Negative Male for AMI  >=14 - Abnormal Female indicating possible myocardial injury.  >=22 - Abnormal Male indicating possible myocardial injury.   Clinicians would have to utilize clinical acumen, EKG, Troponin, and serial changes to determine if it is an Acute Myocardial Infarction or myocardial injury due to an underlying chronic condition.         TSH [[id number]]  (Normal) Collected: 08/29/24 0454    Specimen: Blood Updated: 08/29/24 0539     TSH 1.170 uIU/mL     Lipid Panel [[id number]]  (Abnormal) Collected: 08/29/24 0454    Specimen: Blood Updated: 08/29/24 0532     Total Cholesterol 140 mg/dL      Triglycerides 193 mg/dL      HDL Cholesterol 37 mg/dL      LDL Cholesterol  71 mg/dL      VLDL Cholesterol 32 mg/dL      LDL/HDL Ratio 1.74    Narrative:      Cholesterol Reference Ranges  (U.S. Department of Health and Human Services ATP III Classifications)    Desirable          <200 mg/dL  Borderline High    200-239 mg/dL  High Risk          >240 mg/dL      Triglyceride Reference Ranges  (U.S. Department of Health and Human Services ATP III Classifications)    Normal           <150 mg/dL  Borderline High  150-199 mg/dL  High             200-499 mg/dL  Very High        >500 mg/dL    HDL Reference Ranges  (U.S. Department of Health  and Human Services ATP III Classifications)    Low     <40 mg/dl (major risk factor for CHD)  High    >60 mg/dl ('negative' risk factor for CHD)        LDL Reference Ranges  (U.S. Department of Health and Human Services ATP III Classifications)    Optimal          <100 mg/dL  Near Optimal     100-129 mg/dL  Borderline High  130-159 mg/dL  High             160-189 mg/dL  Very High        >189 mg/dL    Comprehensive Metabolic Panel [[id number]]  (Abnormal) Collected: 08/29/24 0454    Specimen: Blood Updated: 08/29/24 0532     Glucose 112 mg/dL      BUN 19 mg/dL      Creatinine 0.93 mg/dL      Sodium 141 mmol/L      Potassium 3.8 mmol/L      Chloride 106 mmol/L      CO2 25.4 mmol/L      Calcium 8.8 mg/dL      Total Protein 6.0 g/dL      Albumin 3.8 g/dL      ALT (SGPT) 27 U/L      AST (SGOT) 18 U/L      Alkaline Phosphatase 80 U/L      Total Bilirubin 0.4 mg/dL      Globulin 2.2 gm/dL      A/G Ratio 1.7 g/dL      BUN/Creatinine Ratio 20.4     Anion Gap 9.6 mmol/L      eGFR 72.7 mL/min/1.73     Narrative:      GFR Normal >60  Chronic Kidney Disease <60  Kidney Failure <15      Hemoglobin A1c [[id number]]  (Abnormal) Collected: 08/29/24 0454    Specimen: Blood Updated: 08/29/24 0522     Hemoglobin A1C 6.00 %     Narrative:      Hemoglobin A1C Ranges:    Increased Risk for Diabetes  5.7% to 6.4%  Diabetes                     >= 6.5%  Diabetic Goal                < 7.0%    CBC & Differential [[id number]]  (Abnormal) Collected: 08/29/24 0454    Specimen: Blood Updated: 08/29/24 0510    Narrative:      The following orders were created for panel order CBC & Differential.  Procedure                               Abnormality         Status                     ---------                               -----------         ------                     CBC Auto Differential[[id number]]        Abnormal            Final result                 Please view results for these tests on the individual orders.    CBC Auto Differential [[id number]]   (Abnormal) Collected: 08/29/24 0454    Specimen: Blood Updated: 08/29/24 0510     WBC 8.66 10*3/mm3      RBC 4.59 10*6/mm3      Hemoglobin 13.5 g/dL      Hematocrit 40.4 %      MCV 88.0 fL      MCH 29.4 pg      MCHC 33.4 g/dL      RDW 12.7 %      RDW-SD 40.6 fl      MPV 11.6 fL      Platelets 179 10*3/mm3      Neutrophil % 44.4 %      Lymphocyte % 44.0 %      Monocyte % 8.8 %      Eosinophil % 1.7 %      Basophil % 0.8 %      Immature Grans % 0.3 %      Neutrophils, Absolute 3.84 10*3/mm3      Lymphocytes, Absolute 3.81 10*3/mm3      Monocytes, Absolute 0.76 10*3/mm3      Eosinophils, Absolute 0.15 10*3/mm3      Basophils, Absolute 0.07 10*3/mm3      Immature Grans, Absolute 0.03 10*3/mm3      nRBC 0.0 /100 WBC     aPTT [[id number]]  (Abnormal) Collected: 08/28/24 1859    Specimen: Blood Updated: 08/28/24 1952     PTT 55.1 seconds     High Sensitivity Troponin T 2Hr [[id number]] Collected: 08/28/24 1541    Specimen: Blood Updated: 08/28/24 1627     HS Troponin T <6 ng/L      Troponin T Delta --     Comment: Unable to calculate.       Narrative:      High Sensitive Troponin T Reference Range:  <14.0 ng/L- Negative Female for AMI  <22.0 ng/L- Negative Male for AMI  >=14 - Abnormal Female indicating possible myocardial injury.  >=22 - Abnormal Male indicating possible myocardial injury.   Clinicians would have to utilize clinical acumen, EKG, Troponin, and serial changes to determine if it is an Acute Myocardial Infarction or myocardial injury due to an underlying chronic condition.         BNP [[id number]]  (Normal) Collected: 08/28/24 1541    Specimen: Blood Updated: 08/28/24 1627     proBNP <36.0 pg/mL     Narrative:      This assay is used as an aid in the diagnosis of individuals suspected of having heart failure. It can be used as an aid in the diagnosis of acute decompensated heart failure (ADHF) in patients presenting with signs and symptoms of ADHF to the emergency department (ED). In addition, NT-proBNP of <300  pg/mL indicates ADHF is not likely.    Age Range Result Interpretation  NT-proBNP Concentration (pg/mL:      <50             Positive            >450                   Gray                 300-450                    Negative             <300    50-75           Positive            >900                  Gray                300-900                  Negative            <300      >75             Positive            >1800                  Gray                300-1800                  Negative            <300          Imaging Results (Last 24 Hours)       ** No results found for the last 24 hours. **          Scan on 8/28/2024 1339 by [employer], [place]: ECG 12-LEAD         Author: -- Service: -- Author Type: --   Filed: Date of Service: Creation Time:   Status: (Other)   HEART RATE=74  bpm  RR Dqxceauz=427  ms  WV Ortlqbvh=088  ms  P Horizontal Axis=-1  deg  P Front Axis=58  deg  QRSD Interval=82  ms  QT Wszqqmhm=653  ms  LBqS=453  ms  QRS Axis=26  deg  T Wave Axis=65  deg  - OTHERWISE NORMAL ECG -  Sinus rhythm  Low voltage, precordial leads          Current Facility-Administered Medications:     acetaminophen (TYLENOL) tablet 650 mg, 650 mg, Oral, Q4H PRN, Oanh Ramos APRN, 650 mg at 08/29/24 1300    busPIRone (BUSPAR) tablet 15 mg, 15 mg, Oral, TID, Christiano Zambrano MD, 15 mg at 08/29/24 0853    Calcium Replacement - Follow Nurse / BPA Driven Protocol, , Does not apply, PRN, Mey Álvarez APRN    clopidogrel (PLAVIX) tablet 75 mg, 75 mg, Oral, Daily, Christiano Zambrano MD, 75 mg at 08/29/24 0853    famotidine (PEPCID) tablet 20 mg, 20 mg, Oral, Q12H, Christiano Zambrano MD, 20 mg at 08/29/24 0853    FLUoxetine (PROzac) capsule 60 mg, 60 mg, Oral, QAM, Christiano Zambrano MD, 60 mg at 08/29/24 0613    heparin (porcine) 5000 UNIT/ML injection 3,500-4,000 Units, 30-34.8 Units/kg, Intravenous, Q6H PRN, Mey Álvarez APRN, 4,000 Units at 08/29/24 1253    heparin 53994 units/250 mL (100  units/mL) in 0.45 % NaCl infusion, 8.7 Units/kg/hr, Intravenous, Titrated, Mey Álvarez APRN, Last Rate: 14.6 mL/hr at 08/29/24 1259, 12.7 Units/kg/hr at 08/29/24 1259    losartan (COZAAR) tablet 100 mg, 100 mg, Oral, QAM, Christiano Zambrano MD, 100 mg at 08/29/24 0613    Magnesium Standard Dose Replacement - Follow Nurse / BPA Driven Protocol, , Does not apply, PRN, Preeti Mey L, APRN    metoprolol succinate XL (TOPROL-XL) 24 hr tablet 25 mg, 25 mg, Oral, Daily, Christiano Zambrano MD, 25 mg at 08/29/24 0853    nitroglycerin (NITROSTAT) ointment 1 inch, 1 inch, Topical, TID - Nitrates, Mey Álvarez, APRN, 1 inch at 08/29/24 1300    nitroglycerin (NITROSTAT) SL tablet 0.4 mg, 0.4 mg, Sublingual, Q5 Min PRN, Preeti Mey L, APRN    Phosphorus Replacement - Follow Nurse / BPA Driven Protocol, , Does not apply, PRN, Preeti Mey L, APRN    Potassium Replacement - Follow Nurse / BPA Driven Protocol, , Does not apply, PRN, Peevrandall Mey L, APRN    sodium chloride 0.9 % flush 10 mL, 10 mL, Intravenous, Q12H, Vinicio Álvareza L, APRN, 10 mL at 08/29/24 0910    sodium chloride 0.9 % flush 10 mL, 10 mL, Intravenous, PRN, Peevrandall Mey L, APRN    sodium chloride 0.9 % infusion 40 mL, 40 mL, Intravenous, PRN, Peevrandall Mey L, APRN     ASSESSMENT  Chest pain with positive stress Cardiolite admitted for cardiac catheterization  Hypertension  TIA  Osteoarthritis  Degenerative disc disease  Morbidly obese  Obstruct sleep apnea  Gastroesophageal reflux disease    PLAN  CPM  Continue heparin per cardiology  Cardiac catheterization in a.m.  Continue home medications  Stress ulcer DVT prophylaxis  Supportive care  Discussed with family and nursing staff  Will follow with Dr. BA and further recommendation current hospital course    DOMENICO RIBEIRO MD    Copied text in this note has been reviewed and is accurate as of 08/29/24

## 2024-08-29 NOTE — PLAN OF CARE
Goal Outcome Evaluation:         Patient denies chest pain nor discomfort, vital signs stable, cont on heparin drip, educate to call for assist, call light in reach, cath on Friday cont  to monitor.

## 2024-08-29 NOTE — PROGRESS NOTES
[facility]  Cardiology Progress Note    Patient Identification:  Name: Tejas Gonsales  Age: 53 y.o.  Sex: female  :  1970  MRN: [id number]                 Follow Up / Chief Complaint: follow up for cp    Interval History: No cp or shob. He had a headache overnight due to nitro paste.       Objective:    Past Medical History:  Past Medical History:   Diagnosis Date    Anxiety     DDD (degenerative disc disease), lumbar     Depression     History of pancreatitis     History of recent fall     Hypertension     Osteoarthritis     Phlebitis     BILATERAL LOWER EXTREMITY WITH PREGNANCY    Sleep apnea     CPAP    Stress incontinence in female     Stroke     TIA (transient ischemic attack)      Past Surgical History:  Past Surgical History:   Procedure Laterality Date    APPENDECTOMY      BILE DUCT STENT PLACEMENT      COLONOSCOPY      ENDOMETRIAL ABLATION W/ NOVASURE      LAPAROSCOPIC CHOLECYSTECTOMY      LAPAROTOMY OOPHERECTOMY Right     SALPINGECTOMY Right     TRANSVAGINAL TAPING SUSPENSION N/A 2020    Procedure: SLING OPERATION, FEMALE;  Surgeon: Rodriguez Castellanos Jr., MD;  Location: [facility];  Service: Urology;  Laterality: N/A;        Social History:   Social History     Tobacco Use    Smoking status: Former     Current packs/day: 1.00     Average packs/day: 1 pack/day for 2.7 years (2.7 ttl pk-yrs)     Types: Cigarettes     Start date:     Smokeless tobacco: Never   Substance Use Topics    Alcohol use: Yes     Comment: twice per year      Family History:  Family History   Problem Relation Age of Onset    Heart disease Mother     Heart disease Father     Malig Hyperthermia Neg Hx           Allergies:  Allergies   Allergen Reactions    Naproxen Hives     Scheduled Meds:  busPIRone, 15 mg, TID  clopidogrel, 75 mg, Daily  famotidine, 20 mg, Q12H  FLUoxetine, 60 mg, QAM  losartan, 100 mg, QAM  metoprolol succinate XL, 25 mg, Daily  nitroglycerin, 1 inch, TID - Nitrates  sodium  chloride, 10 mL, Q12H            INTAKE AND OUTPUT:    Intake/Output Summary (Last 24 hours) at 8/29/2024 0854  Last data filed at 8/29/2024 0851  Gross per 24 hour   Intake 480 ml   Output 1000 ml   Net -520 ml       Review of Systems:   GI: no n/v or abd pain  Cardiac: no cp or palps  Pulmonary: no shob    Constitutional:  Temp:  [97.5 °F (36.4 °C)-98.5 °F (36.9 °C)] 97.5 °F (36.4 °C)  Heart Rate:  [62-85] 62  Resp:  [16-18] 18  BP: (121-165)/(71-88) 137/79    Physical Exam:  General:  Appears in no acute distress, resting in bed  Eyes: eom normal no conjunctival drainage  HEENT:  No JVD. Thyroid not visibly enlarged. No mucosal pallor or cyanosis  Respiratory: Respirations regular and unlabored at rest. BBS with good air entry in all fields. No crackles, rubs or wheezes auscultated  Cardiovascular: S1S2 Regular rate and rhythm. No murmur, rub or gallop. No carotid bruits. DP/PT pulses     . No pretibial pitting edema  Gastrointestinal: Abdomen soft, flat, non tender. Bowel sounds present. No hepatosplenomegaly. No ascites  Skin:   Skin warm and dry to touch. No rashes    Neuro: AAO x3 CN II-XII grossly intact  Psych: Mood and affect normal, pleasant and cooperative          I reviewed the patient's new clinical results, and personally reviewed and interpreted the patient's ECG and telemetry data from the last 24 hours          Cardiographics                 Lab Review   Results from last 7 days   Lab Units 08/29/24  0454 08/28/24  1541 08/28/24  1329   HSTROP T ng/L <6 <6 6     Results from last 7 days   Lab Units 08/28/24  1329   MAGNESIUM mg/dL 2.1     Results from last 7 days   Lab Units 08/29/24  0454   SODIUM mmol/L 141   POTASSIUM mmol/L 3.8   BUN mg/dL 19   CREATININE mg/dL 0.93   CALCIUM mg/dL 8.8     @[id number]@  Results from last 7 days   Lab Units 08/29/24  0454 08/28/24  1329   WBC 10*3/mm3 8.66 8.74   HEMOGLOBIN g/dL 13.5 13.8   HEMATOCRIT % 40.4 42.2   PLATELETS 10*3/mm3 179 211     Results from  "last 7 days   Lab Units 08/29/24  0454 08/28/24  1859 08/28/24  1329 08/28/24  0657   INR   --   --  1.05 1.0   APTT seconds 70.9* 55.1* 27.3 36.6*         Assessment:    Chest pain  Abnormal stress test  Hypertension  Hx stroke    Plan:  Blood pressure and HR stable.  Cardiac planned for tomorrow.    Procedure, risks and options of cardiac cath explained to pt INCLUDING BUT NOT LIMITED TO MI, STROKE, DEATH, INFECTION HAEMORRHAGE, . Pt understands well and agrees with no further questions.        )8/29/2024  LEFTY Duong/Transcription:   \"Dictated utilizing Dragon dictation\".     "

## 2024-08-30 VITALS
WEIGHT: 253 LBS | HEART RATE: 60 BPM | BODY MASS INDEX: 44.83 KG/M2 | DIASTOLIC BLOOD PRESSURE: 80 MMHG | HEIGHT: 63 IN | OXYGEN SATURATION: 97 % | TEMPERATURE: 98.1 F | SYSTOLIC BLOOD PRESSURE: 123 MMHG | RESPIRATION RATE: 16 BRPM

## 2024-08-30 LAB
ALBUMIN SERPL-MCNC: 3.9 G/DL (ref 3.5–5.2)
ALBUMIN/GLOB SERPL: 1.9 G/DL
ALP SERPL-CCNC: 84 U/L (ref 39–117)
ALT SERPL W P-5'-P-CCNC: 27 U/L (ref 1–33)
ANION GAP SERPL CALCULATED.3IONS-SCNC: 10 MMOL/L (ref 5–15)
APTT PPP: 64.5 SECONDS (ref 22.7–35.4)
AST SERPL-CCNC: 17 U/L (ref 1–32)
BASOPHILS # BLD AUTO: 0.07 10*3/MM3 (ref 0–0.2)
BASOPHILS NFR BLD AUTO: 1 % (ref 0–1.5)
BILIRUB SERPL-MCNC: 0.3 MG/DL (ref 0–1.2)
BUN SERPL-MCNC: 20 MG/DL (ref 6–20)
BUN/CREAT SERPL: 20 (ref 7–25)
CALCIUM SPEC-SCNC: 8.9 MG/DL (ref 8.6–10.5)
CHLORIDE SERPL-SCNC: 105 MMOL/L (ref 98–107)
CO2 SERPL-SCNC: 25 MMOL/L (ref 22–29)
CREAT SERPL-MCNC: 1 MG/DL (ref 0.57–1)
DEPRECATED RDW RBC AUTO: 40.9 FL (ref 37–54)
EGFRCR SERPLBLD CKD-EPI 2021: 66.7 ML/MIN/1.73
EOSINOPHIL # BLD AUTO: 0.17 10*3/MM3 (ref 0–0.4)
EOSINOPHIL NFR BLD AUTO: 2.3 % (ref 0.3–6.2)
ERYTHROCYTE [DISTWIDTH] IN BLOOD BY AUTOMATED COUNT: 12.6 % (ref 12.3–15.4)
GLOBULIN UR ELPH-MCNC: 2.1 GM/DL
GLUCOSE SERPL-MCNC: 108 MG/DL (ref 65–99)
HCT VFR BLD AUTO: 40.3 % (ref 34–46.6)
HGB BLD-MCNC: 13.6 G/DL (ref 12–15.9)
IMM GRANULOCYTES # BLD AUTO: 0.04 10*3/MM3 (ref 0–0.05)
IMM GRANULOCYTES NFR BLD AUTO: 0.5 % (ref 0–0.5)
LYMPHOCYTES # BLD AUTO: 3.52 10*3/MM3 (ref 0.7–3.1)
LYMPHOCYTES NFR BLD AUTO: 48 % (ref 19.6–45.3)
MCH RBC QN AUTO: 30 PG (ref 26.6–33)
MCHC RBC AUTO-ENTMCNC: 33.7 G/DL (ref 31.5–35.7)
MCV RBC AUTO: 88.8 FL (ref 79–97)
MONOCYTES # BLD AUTO: 0.72 10*3/MM3 (ref 0.1–0.9)
MONOCYTES NFR BLD AUTO: 9.8 % (ref 5–12)
NEUTROPHILS NFR BLD AUTO: 2.82 10*3/MM3 (ref 1.7–7)
NEUTROPHILS NFR BLD AUTO: 38.4 % (ref 42.7–76)
NRBC BLD AUTO-RTO: 0 /100 WBC (ref 0–0.2)
PLATELET # BLD AUTO: 186 10*3/MM3 (ref 140–450)
PMV BLD AUTO: 12 FL (ref 6–12)
POTASSIUM SERPL-SCNC: 4 MMOL/L (ref 3.5–5.2)
PROT SERPL-MCNC: 6 G/DL (ref 6–8.5)
RBC # BLD AUTO: 4.54 10*6/MM3 (ref 3.77–5.28)
SODIUM SERPL-SCNC: 140 MMOL/L (ref 136–145)
WBC NRBC COR # BLD AUTO: 7.34 10*3/MM3 (ref 3.4–10.8)

## 2024-08-30 PROCEDURE — C1769 GUIDE WIRE: HCPCS | Performed by: INTERNAL MEDICINE

## 2024-08-30 PROCEDURE — 85025 COMPLETE CBC W/AUTO DIFF WBC: CPT

## 2024-08-30 PROCEDURE — 25010000002 MIDAZOLAM PER 1 MG: Performed by: INTERNAL MEDICINE

## 2024-08-30 PROCEDURE — G0378 HOSPITAL OBSERVATION PER HR: HCPCS

## 2024-08-30 PROCEDURE — 96366 THER/PROPH/DIAG IV INF ADDON: CPT

## 2024-08-30 PROCEDURE — C1894 INTRO/SHEATH, NON-LASER: HCPCS | Performed by: INTERNAL MEDICINE

## 2024-08-30 PROCEDURE — 80053 COMPREHEN METABOLIC PANEL: CPT | Performed by: NURSE PRACTITIONER

## 2024-08-30 PROCEDURE — 25510000001 IOPAMIDOL PER 1 ML: Performed by: INTERNAL MEDICINE

## 2024-08-30 PROCEDURE — 25010000002 FENTANYL CITRATE (PF) 50 MCG/ML SOLUTION: Performed by: INTERNAL MEDICINE

## 2024-08-30 PROCEDURE — 93458 L HRT ARTERY/VENTRICLE ANGIO: CPT | Performed by: INTERNAL MEDICINE

## 2024-08-30 PROCEDURE — 85730 THROMBOPLASTIN TIME PARTIAL: CPT

## 2024-08-30 PROCEDURE — 25010000002 HEPARIN (PORCINE) PER 1000 UNITS: Performed by: INTERNAL MEDICINE

## 2024-08-30 RX ORDER — IOPAMIDOL 755 MG/ML
INJECTION, SOLUTION INTRAVASCULAR
Status: DISCONTINUED | OUTPATIENT
Start: 2024-08-30 | End: 2024-08-30 | Stop reason: HOSPADM

## 2024-08-30 RX ORDER — FENTANYL CITRATE 50 UG/ML
INJECTION, SOLUTION INTRAMUSCULAR; INTRAVENOUS
Status: DISCONTINUED | OUTPATIENT
Start: 2024-08-30 | End: 2024-08-30 | Stop reason: HOSPADM

## 2024-08-30 RX ORDER — MIDAZOLAM HYDROCHLORIDE 1 MG/ML
INJECTION INTRAMUSCULAR; INTRAVENOUS
Status: DISCONTINUED | OUTPATIENT
Start: 2024-08-30 | End: 2024-08-30 | Stop reason: HOSPADM

## 2024-08-30 RX ORDER — HEPARIN SODIUM 1000 [USP'U]/ML
INJECTION, SOLUTION INTRAVENOUS; SUBCUTANEOUS
Status: DISCONTINUED | OUTPATIENT
Start: 2024-08-30 | End: 2024-08-30 | Stop reason: HOSPADM

## 2024-08-30 RX ORDER — VERAPAMIL HYDROCHLORIDE 2.5 MG/ML
INJECTION, SOLUTION INTRAVENOUS
Status: DISCONTINUED | OUTPATIENT
Start: 2024-08-30 | End: 2024-08-30 | Stop reason: HOSPADM

## 2024-08-30 RX ORDER — LIDOCAINE HYDROCHLORIDE 20 MG/ML
INJECTION, SOLUTION INFILTRATION; PERINEURAL
Status: DISCONTINUED | OUTPATIENT
Start: 2024-08-30 | End: 2024-08-30 | Stop reason: HOSPADM

## 2024-08-30 RX ADMIN — METOPROLOL SUCCINATE 25 MG: 25 TABLET, EXTENDED RELEASE ORAL at 08:32

## 2024-08-30 RX ADMIN — LOSARTAN POTASSIUM 100 MG: 100 TABLET, FILM COATED ORAL at 06:02

## 2024-08-30 RX ADMIN — FAMOTIDINE 20 MG: 20 TABLET, FILM COATED ORAL at 08:32

## 2024-08-30 RX ADMIN — CLOPIDOGREL BISULFATE 75 MG: 75 TABLET, FILM COATED ORAL at 08:32

## 2024-08-30 RX ADMIN — BUSPIRONE HYDROCHLORIDE 15 MG: 15 TABLET ORAL at 08:32

## 2024-08-30 RX ADMIN — FLUOXETINE HYDROCHLORIDE 60 MG: 20 CAPSULE ORAL at 06:03

## 2024-08-30 RX ADMIN — Medication 10 ML: at 08:32

## 2024-08-30 NOTE — DISCHARGE INSTR - APPOINTMENTS
Nevin Falcon MD PCP - General Internal Medicine [phone] [address] [facility] [address]   **Appt. On September 6th at 1:45PM!! Arrive Early!!**

## 2024-08-30 NOTE — PROGRESS NOTES
"Daily progress note    Referring physician  Dr. JESENIA Stauffer  Doing same with no new complaint and going for cardiac catheterization    History of present illness  53-year-old white female with history of osteoarthritis degenerative disease hypertension obstruct sleep apnea and TIA in the past admitted to cardiology service with complaint of chest pain and abnormal stress Cardiolite for cardiac catheterization on Friday.  I am asked to follow patient for medical problems.  Patient denies any chest pain shortness of breath palpitation at the time of examination.  Patient denies any fever cough congestion night sweats weight loss or weight gain.    Review of Systems:  Unremarkable    Physical Exam:  Blood pressure 124/78, pulse 70, temperature 98 °F (36.7 °C), temperature source Oral, resp. rate 16, height 160 cm (63\"), weight 115 kg (253 lb), SpO2 95%, not currently breastfeeding.    General: Awake and alert in no distress   HEENT: Unremarkable  Respiratory: Normal effort and moving air bilaterally  Cardiovascular: S1S2 Regular rate and rhythm. No murmur, rubs or gallop.   Gastrointestinal: Soft nontender nondistended bowel sounds positive  Musculoskeletal: SIERRA x4. No abnormal movements  Extremities: No digital clubbing or cyanosis  Skin: Color pink. Skin warm and dry to touch. No rashes  No xanthoma  Neuro: AAO x3 CN II-XII grossly intact     LABS  Lab Results (last 24 hours)       Procedure Component Value Units Date/Time    aPTT [[id number]]  (Abnormal) Collected: 08/30/24 0418    Specimen: Blood from Arm, Right Updated: 08/30/24 0536     PTT 64.5 seconds     Comprehensive Metabolic Panel [[id number]]  (Abnormal) Collected: 08/30/24 0418    Specimen: Blood Updated: 08/30/24 0524     Glucose 108 mg/dL      BUN 20 mg/dL      Creatinine 1.00 mg/dL      Sodium 140 mmol/L      Potassium 4.0 mmol/L      Chloride 105 mmol/L      CO2 25.0 mmol/L      Calcium 8.9 mg/dL      Total Protein 6.0 g/dL      Albumin 3.9 g/dL     "  ALT (SGPT) 27 U/L      AST (SGOT) 17 U/L      Alkaline Phosphatase 84 U/L      Total Bilirubin 0.3 mg/dL      Globulin 2.1 gm/dL      A/G Ratio 1.9 g/dL      BUN/Creatinine Ratio 20.0     Anion Gap 10.0 mmol/L      eGFR 66.7 mL/min/1.73     Narrative:      GFR Normal >60  Chronic Kidney Disease <60  Kidney Failure <15      CBC & Differential [[id number]]  (Abnormal) Collected: 08/30/24 0418    Specimen: Blood Updated: 08/30/24 0503    Narrative:      The following orders were created for panel order CBC & Differential.  Procedure                               Abnormality         Status                     ---------                               -----------         ------                     CBC Auto Differential[[id number]]        Abnormal            Final result                 Please view results for these tests on the individual orders.    CBC Auto Differential [[id number]]  (Abnormal) Collected: 08/30/24 0418    Specimen: Blood Updated: 08/30/24 0503     WBC 7.34 10*3/mm3      RBC 4.54 10*6/mm3      Hemoglobin 13.6 g/dL      Hematocrit 40.3 %      MCV 88.8 fL      MCH 30.0 pg      MCHC 33.7 g/dL      RDW 12.6 %      RDW-SD 40.9 fl      MPV 12.0 fL      Platelets 186 10*3/mm3      Neutrophil % 38.4 %      Lymphocyte % 48.0 %      Monocyte % 9.8 %      Eosinophil % 2.3 %      Basophil % 1.0 %      Immature Grans % 0.5 %      Neutrophils, Absolute 2.82 10*3/mm3      Lymphocytes, Absolute 3.52 10*3/mm3      Monocytes, Absolute 0.72 10*3/mm3      Eosinophils, Absolute 0.17 10*3/mm3      Basophils, Absolute 0.07 10*3/mm3      Immature Grans, Absolute 0.04 10*3/mm3      nRBC 0.0 /100 WBC     aPTT [[id number]]  (Abnormal) Collected: 08/29/24 2121    Specimen: Blood Updated: 08/29/24 2228     PTT 91.2 seconds     aPTT [[id number]]  (Abnormal) Collected: 08/29/24 1634    Specimen: Blood Updated: 08/29/24 1743     PTT 92.8 seconds     aPTT [[id number]]  (Abnormal) Collected: 08/29/24 1107    Specimen: Blood from Arm, Right  Updated: 08/29/24 1242     PTT 47.9 seconds           Imaging Results (Last 24 Hours)       ** No results found for the last 24 hours. **          Scan on 8/28/2024 1339 by [facility], [place]: ECG 12-LEAD         Author: -- Service: -- Author Type: --   Filed: Date of Service: Creation Time:   Status: (Other)   HEART RATE=74  bpm  RR Nfzkaedr=060  ms  WI Cnermcah=945  ms  P Horizontal Axis=-1  deg  P Front Axis=58  deg  QRSD Interval=82  ms  QT Tplcrvww=617  ms  BFxX=535  ms  QRS Axis=26  deg  T Wave Axis=65  deg  - OTHERWISE NORMAL ECG -  Sinus rhythm  Low voltage, precordial leads          Current Facility-Administered Medications:     [Transfer Hold] acetaminophen (TYLENOL) tablet 650 mg, 650 mg, Oral, Q4H PRN, Oanh Ramos APRN, 650 mg at 08/29/24 2158    [Transfer Hold] busPIRone (BUSPAR) tablet 15 mg, 15 mg, Oral, TID, Christiano Zambrano MD, 15 mg at 08/30/24 0832    [Transfer Hold] Calcium Replacement - Follow Nurse / BPA Driven Protocol, , Does not apply, PRN, Mey Álvarez APRN    [Transfer Hold] clopidogrel (PLAVIX) tablet 75 mg, 75 mg, Oral, Daily, Christiano Zambrano MD, 75 mg at 08/30/24 0832    famotidine (PEPCID) tablet 20 mg, 20 mg, Oral, Q12H, Christiano Zambrano MD, 20 mg at 08/30/24 0832    [Transfer Hold] FLUoxetine (PROzac) capsule 60 mg, 60 mg, Oral, QAM, Christiano Zambrano MD, 60 mg at 08/30/24 0603    [Transfer Hold] heparin (porcine) 5000 UNIT/ML injection 3,500-4,000 Units, 30-34.8 Units/kg, Intravenous, Q6H PRN, Mey Álvarez APRN, 4,000 Units at 08/29/24 1253    heparin 86930 units/250 mL (100 units/mL) in 0.45 % NaCl infusion, 8.7 Units/kg/hr, Intravenous, Titrated, Peevey, Mey L, APRN, Last Rate: 12.39 mL/hr at 08/30/24 0547, 10.78 Units/kg/hr at 08/30/24 0547    losartan (COZAAR) tablet 100 mg, 100 mg, Oral, Juan Manuel ALEXANDRE Shanker, MD, 100 mg at 08/30/24 0602    [Transfer Hold] Magnesium Standard Dose Replacement - Follow Nurse / BPA Driven  Protocol, , Does not apply, PRN, Mey Álvarez, APRN    metoprolol succinate XL (TOPROL-XL) 24 hr tablet 25 mg, 25 mg, Oral, Daily, Christiano Zambrano MD, 25 mg at 08/30/24 0832    [Transfer Hold] nitroglycerin (NITROSTAT) ointment 1 inch, 1 inch, Topical, TID - Nitrates, Mey Álvarez APRN, 1 inch at 08/29/24 1659    [Transfer Hold] nitroglycerin (NITROSTAT) SL tablet 0.4 mg, 0.4 mg, Sublingual, Q5 Min PRN, Mey Álvarez, APRN    [Transfer Hold] Phosphorus Replacement - Follow Nurse / BPA Driven Protocol, , Does not apply, PRN, Mey Álvarez, APRN    Potassium Replacement - Follow Nurse / BPA Driven Protocol, , Does not apply, PRN, Mey Álvarez L, APRN    [Transfer Hold] sodium chloride 0.9 % flush 10 mL, 10 mL, Intravenous, Q12H, Mey Álvarez, APRN, 10 mL at 08/30/24 0832    [Transfer Hold] sodium chloride 0.9 % flush 10 mL, 10 mL, Intravenous, PRN, Mey Álvarez L, APRN    [Transfer Hold] sodium chloride 0.9 % infusion 40 mL, 40 mL, Intravenous, PRN, Mey Álvarez, APRN     ASSESSMENT  Chest pain with positive stress Cardiolite admitted for cardiac catheterization  Hypertension  TIA  Osteoarthritis  Degenerative disc disease  Morbidly obese  Obstruct sleep apnea  Gastroesophageal reflux disease    PLAN  CPM  Continue heparin   Cardiac catheterization today  Continue home medications  Stress ulcer DVT prophylaxis  Supportive care  Discussed with family and nursing staff  Will follow with Dr. BA and further recommendation current hospital course    DOMENICO RIBEIRO MD    Copied text in this note has been reviewed and is accurate as of 08/30/24

## 2024-08-30 NOTE — DISCHARGE SUMMARY
[facility]  Physician Discharge Summary    Patient Identification:  Name: Tejas Gonsales  Age: 53 y.o.  Sex: female  :  1970  MRN: [id number]    Admit date: 2024    Discharge date and time:  24      Admitting Physician: Christiano Zambrano MD     Discharge Physician: Dr Zambrano    Discharge Diagnoses:   Active Hospital Problems    Diagnosis     **Chest pain     Abnormal cardiovascular stress test     Hypertension     Chest pain, atypical        Discharged Condition: stable    Hospital Course:   This is a 53 year old female who is known to our service admitted with chest pain and abnormal stress test. Please see H and P for full details of admission. She was started on heparin gtt and nitro paste and underwent left heart cath. Left heart cath 24 with normal coronaries. She recovered from cath as expected without complication. Vital signs stable at time of discharge and she is cardiovascular stable for outpatient follow up.       Consults:   IP CONSULT TO INTERNAL MEDICINE    Discharge Exam:        LABS:  Results from last 7 days   Lab Units 24  0454 24  1541 24  1329   HSTROP T ng/L <6 <6 6     Results from last 7 days   Lab Units 24  1329   MAGNESIUM mg/dL 2.1     Results from last 7 days   Lab Units 24  0418   SODIUM mmol/L 140   POTASSIUM mmol/L 4.0   BUN mg/dL 20   CREATININE mg/dL 1.00   CALCIUM mg/dL 8.9     @LABRCNTbnp@  Results from last 7 days   Lab Units 24  0418 24  0454 24  1329   WBC 10*3/mm3 7.34 8.66 8.74   HEMOGLOBIN g/dL 13.6 13.5 13.8   HEMATOCRIT % 40.3 40.4 42.2   PLATELETS 10*3/mm3 186 179 211     Results from last 7 days   Lab Units 24  0418 24  2121 24  1634 24  1859 24  1329 24  0657   INR   --   --   --   --  1.05 1.0   APTT seconds 64.5* 91.2* 92.8*   < > 27.3 36.6*    < > = values in this interval not displayed.     Results from last 7 days   Lab Units  08/29/24  0454   CHOLESTEROL mg/dL 140   TRIGLYCERIDES mg/dL 193*   HDL CHOL mg/dL 37*   LDL CHOL mg/dL 71     Disposition:  Home    Discharge Medications:      Discharge Medications        Continue These Medications        Instructions Start Date   busPIRone 15 MG tablet  Commonly known as: BUSPAR   15 mg, Oral, 3 Times Daily      clopidogrel 75 MG tablet  Commonly known as: PLAVIX   75 mg, Oral, Daily, PT HOLDING FOR SURGERY/PT STOPPED TAKING ON OWN, INSTRUCTED TO CONTACT PCP REGARDING HOLDING PRIOR TO SURGERY       famotidine 20 MG tablet  Commonly known as: PEPCID   1 tablet, Oral, Every 12 Hours Scheduled      LORazepam 1 MG tablet  Commonly known as: ATIVAN   1 mg, Oral, Every 8 Hours PRN      losartan 100 MG tablet  Commonly known as: COZAAR   100 mg, Oral, Every Morning      metoprolol succinate XL 25 MG 24 hr tablet  Commonly known as: TOPROL-XL   25 mg, Oral, Daily      PROzac 20 MG capsule  Generic drug: FLUoxetine   60 mg, Oral, Every Morning             Stop These Medications      lisinopril 10 MG tablet  Commonly known as: PRINIVIL,ZESTRIL                Discharge Home Instructions:  Routine post cardiac catheterization/PCI discharge home care instructions:    1. No submerging procedure site below water for 7-10 days.  2. No lifting objects greater than 1 lbs for 3 days.  3. If groin site used, avoid climbing several flights of stairs or sitting for longer than 2 hours at a time for the next 24 hours.   4. Monitor puncture site for bleeding and/or knots;. If bleeding should occur at the groin site: lie flat, apply pressure and return to the ER. If bleeding should occur at the wrist site, apply pressure and return to the ER.  5.  You may apply a DRY Band-Aid over the puncture site if needed. Do not apply any lotions, salves or ointments to site.  6. No driving for 3 days.  7. Return to ER for recurrent symptoms.  8. No smoking.  9. Take all medications as prescribed.    Follow up on the 17th at  "11    Signed:  Mey Álvarez, APRN  8/30/2024  14:31 EDT      EMR Dragon/Transcription:   \"Dictated utilizing Dragon dictation\".     "

## 2024-08-30 NOTE — PLAN OF CARE
Problem: Chest Pain  Goal: Resolution of Chest Pain Symptoms  Outcome: Ongoing, Progressing   Goal Outcome Evaluation:

## 2024-08-31 NOTE — CASE MANAGEMENT/SOCIAL WORK
Case Management Discharge Note      Final Note: Home         Selected Continued Care - Discharged on 8/30/2024 Admission date: 8/28/2024 - Discharge disposition: Home or Self Care      Destination    No services have been selected for the patient.                Durable Medical Equipment    No services have been selected for the patient.                Dialysis/Infusion    No services have been selected for the patient.                Home Medical Care    No services have been selected for the patient.                Therapy    No services have been selected for the patient.                Community Resources    No services have been selected for the patient.                Community & DME    No services have been selected for the patient.                         Final Discharge Disposition Code: 01 - home or self-care

## 2024-09-08 LAB
BH CV STRESS BP STAGE 1: NORMAL
BH CV STRESS BP STAGE 2: NORMAL
BH CV STRESS DURATION MIN STAGE 1: 5
BH CV STRESS DURATION MIN STAGE 2: 3
BH CV STRESS DURATION SEC STAGE 1: 5
BH CV STRESS DURATION SEC STAGE 2: 8
BH CV STRESS GRADE STAGE 1: 10
BH CV STRESS GRADE STAGE 2: 12
BH CV STRESS HR STAGE 1: 124
BH CV STRESS HR STAGE 2: 150
BH CV STRESS METS STAGE 1: 4.6
BH CV STRESS METS STAGE 2: 6.3
BH CV STRESS O2 STAGE 2: 98
BH CV STRESS PROTOCOL 1: NORMAL
BH CV STRESS RECOVERY BP: NORMAL MMHG
BH CV STRESS RECOVERY HR: 106 BPM
BH CV STRESS RECOVERY O2: 98 %
BH CV STRESS SPEED STAGE 1: 1.7
BH CV STRESS SPEED STAGE 2: 2.2
BH CV STRESS STAGE 1: 1
BH CV STRESS STAGE 2: 2
MAXIMAL PREDICTED HEART RATE: 165 BPM
PERCENT MAX PREDICTED HR: 90.91 %
STRESS BASELINE BP: NORMAL MMHG
STRESS BASELINE HR: 86 BPM
STRESS O2 SAT REST: 97 %
STRESS PERCENT HR: 107 %
STRESS POST ESTIMATED WORKLOAD: 6.3 METS
STRESS POST EXERCISE DUR MIN: 8 MIN
STRESS POST EXERCISE DUR SEC: 13 SEC
STRESS POST PEAK BP: NORMAL MMHG
STRESS POST PEAK HR: 150 BPM
STRESS TARGET HR: 140 BPM

## 2024-09-17 ENCOUNTER — OFFICE VISIT (OUTPATIENT)
Dept: CARDIOLOGY | Facility: CLINIC | Age: 54
End: 2024-09-17
Payer: COMMERCIAL

## 2024-09-17 VITALS
DIASTOLIC BLOOD PRESSURE: 73 MMHG | HEART RATE: 68 BPM | WEIGHT: 253 LBS | SYSTOLIC BLOOD PRESSURE: 112 MMHG | BODY MASS INDEX: 43.19 KG/M2 | HEIGHT: 64 IN

## 2024-09-17 DIAGNOSIS — R07.89 CHEST PAIN, ATYPICAL: Primary | ICD-10-CM

## 2024-09-17 DIAGNOSIS — I10 HYPERTENSION, UNSPECIFIED TYPE: ICD-10-CM

## 2024-09-17 RX ORDER — NITROGLYCERIN 0.4 MG/1
0.4 TABLET SUBLINGUAL
COMMUNITY
Start: 2024-09-08

## (undated) DEVICE — LOU D & C HYSTEROSCOPY: Brand: MEDLINE INDUSTRIES, INC.

## (undated) DEVICE — PK CATH CARD 40

## (undated) DEVICE — 3M™ STERI-STRIP™ REINFORCED ADHESIVE SKIN CLOSURES, R1546, 1/4 IN X 4 IN (6 MM X 100 MM), 10 STRIPS/ENVELOPE: Brand: 3M™ STERI-STRIP™

## (undated) DEVICE — STRAP STIRUP WO/ RNG

## (undated) DEVICE — NDL HYPO PRECISIONGLIDE REG 20G 1 1/2

## (undated) DEVICE — ST IRR CYSTO W/SPK 77IN LF

## (undated) DEVICE — COVER,MAYO STAND,STERILE: Brand: MEDLINE

## (undated) DEVICE — INTENDED FOR TISSUE SEPARATION, AND OTHER PROCEDURES THAT REQUIRE A SHARP SURGICAL BLADE TO PUNCTURE OR CUT.: Brand: BARD-PARKER ® CARBON RIB-BACK BLADES

## (undated) DEVICE — METER,URINE,400ML,DRAIN BAG,L/F,LL,SLIDE: Brand: MEDLINE

## (undated) DEVICE — 1010 S-DRAPE TOWEL DRAPE 10/BX: Brand: STERI-DRAPE™

## (undated) DEVICE — KT MANIFLD CARDIAC

## (undated) DEVICE — SKIN PREP TRAY W/CHG: Brand: MEDLINE INDUSTRIES, INC.

## (undated) DEVICE — SPNG GZ WOVN 4X4IN 12PLY 10/BX STRL

## (undated) DEVICE — DGW .035 FC J3MM 260CM TEF: Brand: EMERALD

## (undated) DEVICE — TR BAND RADIAL ARTERY COMPRESSION DEVICE: Brand: TR BAND

## (undated) DEVICE — MEDI-VAC YANKAUER SUCTION HANDLE W/BULBOUS TIP: Brand: CARDINAL HEALTH

## (undated) DEVICE — PENCL E/S ULTRAVAC TELESCP NOSE HOLSTR 10FT

## (undated) DEVICE — TRAP FLD MINIVAC MEGADYNE 100ML

## (undated) DEVICE — CATH DIAG IMPULSE FL3.5 5F 100CM

## (undated) DEVICE — NEEDLE, QUINCKE 22GX3.5": Brand: MEDLINE INDUSTRIES, INC.

## (undated) DEVICE — GLIDESHEATH SLENDER STAINLESS STEEL KIT: Brand: GLIDESHEATH SLENDER

## (undated) DEVICE — CATH DIAG IMPULSE FR4 5F 100CM

## (undated) DEVICE — PAD SANI MAXI W/ADHS SNG WRP 11IN

## (undated) DEVICE — SYR LUERLOK 20CC BX/50

## (undated) DEVICE — CATH FOL SIMPLYLATEX SILELAST 16F 17IN

## (undated) DEVICE — GLV SURG SENSICARE PI MIC PF SZ7 LF STRL

## (undated) DEVICE — 3M™ STERI-STRIP™ COMPOUND BENZOIN TINCTURE 40 BAGS/CARTON 4 CARTONS/CASE C1544: Brand: 3M™ STERI-STRIP™

## (undated) DEVICE — PLUG,CATHETER,DRAINAGE PROTECTOR,TUBE: Brand: MEDLINE